# Patient Record
Sex: FEMALE | Race: WHITE | NOT HISPANIC OR LATINO | Employment: FULL TIME | URBAN - METROPOLITAN AREA
[De-identification: names, ages, dates, MRNs, and addresses within clinical notes are randomized per-mention and may not be internally consistent; named-entity substitution may affect disease eponyms.]

---

## 2017-01-18 ENCOUNTER — OFFICE VISIT - HEALTHEAST (OUTPATIENT)
Dept: FAMILY MEDICINE | Facility: CLINIC | Age: 57
End: 2017-01-18

## 2017-01-18 ENCOUNTER — OFFICE VISIT - HEALTHEAST (OUTPATIENT)
Dept: PHYSICAL THERAPY | Facility: REHABILITATION | Age: 57
End: 2017-01-18

## 2017-01-18 DIAGNOSIS — N39.46 MIXED INCONTINENCE: ICD-10-CM

## 2017-01-18 DIAGNOSIS — N39.3 FEMALE STRESS INCONTINENCE: ICD-10-CM

## 2017-01-18 DIAGNOSIS — R73.03 PREDIABETES: ICD-10-CM

## 2017-01-18 DIAGNOSIS — I10 ESSENTIAL HYPERTENSION WITH GOAL BLOOD PRESSURE LESS THAN 140/90: ICD-10-CM

## 2017-01-18 DIAGNOSIS — M62.81 MUSCLE WEAKNESS (GENERALIZED): ICD-10-CM

## 2017-01-18 ASSESSMENT — MIFFLIN-ST. JEOR: SCORE: 1315.24

## 2017-01-18 NOTE — ASSESSMENT & PLAN NOTE
56 y.o. year old female in clinic today to discuss treatment of the following conditions through diet and lifestyle modification and weight loss:  1. BMI 30.0-30.9,adult    2. Prediabetes    3. Female stress incontinence    4. Essential hypertension with goal blood pressure less than 140/90      The patient believes that she is lost control of her approach to eating in the 7 months and she is most recently been in clinic.  She has gained much of the weight that she lost back and would like to restart the program.  Specifically, she is looking for structure and accountability.  No contraindication to resuming phentermine.  Additionally, no contra indication to resuming metformin.  Refill sent to pharmacy.  She will follow-upwith us in 1 month.

## 2017-02-15 ENCOUNTER — OFFICE VISIT - HEALTHEAST (OUTPATIENT)
Dept: FAMILY MEDICINE | Facility: CLINIC | Age: 57
End: 2017-02-15

## 2017-02-15 DIAGNOSIS — R73.03 PREDIABETES: ICD-10-CM

## 2017-02-15 DIAGNOSIS — N39.3 FEMALE STRESS INCONTINENCE: ICD-10-CM

## 2017-02-15 DIAGNOSIS — I10 ESSENTIAL HYPERTENSION WITH GOAL BLOOD PRESSURE LESS THAN 140/90: ICD-10-CM

## 2017-02-15 DIAGNOSIS — E78.00 HYPERCHOLESTEREMIA: ICD-10-CM

## 2017-02-15 NOTE — ASSESSMENT & PLAN NOTE
56 y.o. year old female in clinic today to discuss treatment of the following conditions through diet and lifestyle modification and weight loss:  1. BMI 30.0-30.9,adult    2. Essential hypertension with goal blood pressure less than 140/90    3. Female stress incontinence    4. Prediabetes    5. Hypercholesteremia      The patient's efforts this past month were successful as evidenced by her weight loss as well as feeling well.  Energy is increased.  She is making healthy choices.  I am recommending that she increase her exercise as well as strategize her food choices during times of illness/stress.  Follow-up in 1 month.  The patient tolerated phentermine and is using this medication to facilitate lysed all changes.  No contraindication to continuing.

## 2017-03-13 ENCOUNTER — COMMUNICATION - HEALTHEAST (OUTPATIENT)
Dept: FAMILY MEDICINE | Facility: CLINIC | Age: 57
End: 2017-03-13

## 2017-03-22 ENCOUNTER — OFFICE VISIT - HEALTHEAST (OUTPATIENT)
Dept: FAMILY MEDICINE | Facility: CLINIC | Age: 57
End: 2017-03-22

## 2017-03-22 DIAGNOSIS — I10 ESSENTIAL HYPERTENSION WITH GOAL BLOOD PRESSURE LESS THAN 140/90: ICD-10-CM

## 2017-03-22 DIAGNOSIS — N39.3 FEMALE STRESS INCONTINENCE: ICD-10-CM

## 2017-03-22 DIAGNOSIS — E78.00 HYPERCHOLESTEREMIA: ICD-10-CM

## 2017-03-22 DIAGNOSIS — R73.03 PREDIABETES: ICD-10-CM

## 2017-03-22 NOTE — ASSESSMENT & PLAN NOTE
57 y.o. year old female in clinic today to discuss treatment of the following conditions through diet and lifestyle modification and weight loss:  1. BMI 30.0-30.9,adult    2. Hypercholesteremia    3. Essential hypertension with goalblood pressure less than 140/90    4. Female stress incontinence    5. Prediabetes      The patient's efforts this past month were successful as evidenced by weight loss and improvement in energy.  I recommend that the patient focus on continuing to expand exercise and continue to journal.   - start biking.   - continue journaling   - continue metformin, phentermine   - fasting labs when able.

## 2017-04-07 ENCOUNTER — AMBULATORY - HEALTHEAST (OUTPATIENT)
Dept: LAB | Facility: CLINIC | Age: 57
End: 2017-04-07

## 2017-04-07 DIAGNOSIS — E78.00 HYPERCHOLESTEREMIA: ICD-10-CM

## 2017-04-07 DIAGNOSIS — R73.03 PREDIABETES: ICD-10-CM

## 2017-04-07 LAB
CHOLEST SERPL-MCNC: 208 MG/DL
FASTING STATUS PATIENT QL REPORTED: YES
HBA1C MFR BLD: 5.7 % (ref 3.5–6)
HDLC SERPL-MCNC: 53 MG/DL
LDLC SERPL CALC-MCNC: 135 MG/DL
TRIGL SERPL-MCNC: 99 MG/DL

## 2017-04-24 ENCOUNTER — COMMUNICATION - HEALTHEAST (OUTPATIENT)
Dept: FAMILY MEDICINE | Facility: CLINIC | Age: 57
End: 2017-04-24

## 2017-05-03 ENCOUNTER — OFFICE VISIT - HEALTHEAST (OUTPATIENT)
Dept: FAMILY MEDICINE | Facility: CLINIC | Age: 57
End: 2017-05-03

## 2017-05-03 DIAGNOSIS — R73.03 PREDIABETES: ICD-10-CM

## 2017-05-03 DIAGNOSIS — I10 ESSENTIAL HYPERTENSION WITH GOAL BLOOD PRESSURE LESS THAN 140/90: ICD-10-CM

## 2017-05-03 DIAGNOSIS — E66.9 ADIPOSITY: ICD-10-CM

## 2017-05-03 DIAGNOSIS — N39.3 FEMALE STRESS INCONTINENCE: ICD-10-CM

## 2017-05-03 DIAGNOSIS — E78.00 HYPERCHOLESTEREMIA: ICD-10-CM

## 2017-05-03 NOTE — ASSESSMENT & PLAN NOTE
57 y.o. year old female in clinic today to discuss treatment of the following conditions through diet and lifestyle modification and weight loss:  1. BMI 30.0-30.9,adult    2. Prediabetes    3. Essential hypertension with goal blood pressure less than 140/90    4. Hypercholesteremia    5. Female stress incontinence    6. Adiposity      The patient's efforts this past month were successful as evidenced by weight loss despite multiple social situations.     - continue exercising   - continue phentermine andmeformin   - reviewed labs.  A1c is consistent with prediabetes   - RTC one month.

## 2017-06-07 ENCOUNTER — OFFICE VISIT - HEALTHEAST (OUTPATIENT)
Dept: FAMILY MEDICINE | Facility: CLINIC | Age: 57
End: 2017-06-07

## 2017-06-07 DIAGNOSIS — R73.03 PREDIABETES: ICD-10-CM

## 2017-06-07 DIAGNOSIS — E66.9 ADIPOSITY: ICD-10-CM

## 2017-06-07 DIAGNOSIS — I10 ESSENTIAL HYPERTENSION WITH GOAL BLOOD PRESSURE LESS THAN 140/90: ICD-10-CM

## 2017-06-07 DIAGNOSIS — N39.3 FEMALE STRESS INCONTINENCE: ICD-10-CM

## 2017-06-07 DIAGNOSIS — E78.00 HYPERCHOLESTEREMIA: ICD-10-CM

## 2017-06-07 NOTE — ASSESSMENT & PLAN NOTE
57 y.o. year old female in clinic today to discuss treatment of the following conditions through diet and lifestyle modification and weight loss:  1. BMI 30.0-30.9,adult    2. Prediabetes    3. Essential hypertension with goal blood pressure less than 140/90    4. Hypercholesteremia    5. Female stress incontinence    6. Adiposity      The patient's efforts this past month were successful as evidenced by weight stability.  I recommend that the patient focus on meal planning journaling as this will address her self sabotaging behavior.   -Continue phentermine.  Consider decreasing based on age and weight at next visit.  -Regardless of weight loss, we will obtain a body composition analysis at the next visit.  -The patient has a goal of 140 pounds.  We did discuss that she has been successful in her program and that her weight is approaching normal weight for height using BMI is a measure.  She has accomplished most of her non-quantitative goals which is feeling more energetic, increasing physical activity, eating healthier foods, resolution of hypertension.  -We will plan to check a hemoglobin A1c in 1-2 months.

## 2017-07-12 ENCOUNTER — OFFICE VISIT - HEALTHEAST (OUTPATIENT)
Dept: FAMILY MEDICINE | Facility: CLINIC | Age: 57
End: 2017-07-12

## 2017-07-12 DIAGNOSIS — N39.3 FEMALE STRESS INCONTINENCE: ICD-10-CM

## 2017-07-12 DIAGNOSIS — R73.03 PREDIABETES: ICD-10-CM

## 2017-07-12 DIAGNOSIS — E78.00 HYPERCHOLESTEREMIA: ICD-10-CM

## 2017-07-12 DIAGNOSIS — E66.9 ADIPOSITY: ICD-10-CM

## 2017-07-12 DIAGNOSIS — I10 ESSENTIAL HYPERTENSION WITH GOAL BLOOD PRESSURE LESS THAN 140/90: ICD-10-CM

## 2017-07-12 NOTE — ASSESSMENT & PLAN NOTE
57 y.o. year old female in clinic today to discuss treatment of the following conditions through diet and lifestyle modification and weight loss:  1. BMI 30.0-30.9,adult    2. Prediabetes    3. Essential hypertension with goal blood pressure less than 140/90    4. Hypercholesteremia    5. Female stress incontinence    6. Adiposity      The patient's efforts this past month were successful as evidenced by weight loss.  I recommend that the patient focus on reducing social calories.    - continue phentermine   - continue to track   - resume exercise

## 2017-10-23 ENCOUNTER — RECORDS - HEALTHEAST (OUTPATIENT)
Dept: ADMINISTRATIVE | Facility: OTHER | Age: 57
End: 2017-10-23

## 2017-12-17 ENCOUNTER — COMMUNICATION - HEALTHEAST (OUTPATIENT)
Dept: FAMILY MEDICINE | Facility: CLINIC | Age: 57
End: 2017-12-17

## 2017-12-19 ENCOUNTER — AMBULATORY - HEALTHEAST (OUTPATIENT)
Dept: FAMILY MEDICINE | Facility: CLINIC | Age: 57
End: 2017-12-19

## 2017-12-19 DIAGNOSIS — N95.1 HOT FLASHES DUE TO MENOPAUSE: ICD-10-CM

## 2017-12-19 DIAGNOSIS — R23.2 HOT FLASHES: ICD-10-CM

## 2018-01-10 ENCOUNTER — HOSPITAL ENCOUNTER (OUTPATIENT)
Dept: MAMMOGRAPHY | Facility: CLINIC | Age: 58
Discharge: HOME | End: 2018-01-10
Attending: NURSE PRACTITIONER

## 2018-01-10 DIAGNOSIS — Z12.31 VISIT FOR SCREENING MAMMOGRAM: ICD-10-CM

## 2018-01-18 ENCOUNTER — COMMUNICATION - HEALTHEAST (OUTPATIENT)
Dept: FAMILY MEDICINE | Facility: CLINIC | Age: 58
End: 2018-01-18

## 2018-03-26 ENCOUNTER — COMMUNICATION - HEALTHEAST (OUTPATIENT)
Dept: FAMILY MEDICINE | Facility: CLINIC | Age: 58
End: 2018-03-26

## 2018-04-18 ENCOUNTER — OFFICE VISIT - HEALTHEAST (OUTPATIENT)
Dept: FAMILY MEDICINE | Facility: CLINIC | Age: 58
End: 2018-04-18

## 2018-04-18 DIAGNOSIS — Z79.899 HIGH RISK MEDICATION USE: ICD-10-CM

## 2018-05-01 ENCOUNTER — COMMUNICATION - HEALTHEAST (OUTPATIENT)
Dept: FAMILY MEDICINE | Facility: CLINIC | Age: 58
End: 2018-05-01

## 2018-06-14 ENCOUNTER — RECORDS - HEALTHEAST (OUTPATIENT)
Dept: ADMINISTRATIVE | Facility: OTHER | Age: 58
End: 2018-06-14

## 2018-06-20 ENCOUNTER — COMMUNICATION - HEALTHEAST (OUTPATIENT)
Dept: FAMILY MEDICINE | Facility: CLINIC | Age: 58
End: 2018-06-20

## 2018-06-27 ENCOUNTER — OFFICE VISIT - HEALTHEAST (OUTPATIENT)
Dept: FAMILY MEDICINE | Facility: CLINIC | Age: 58
End: 2018-06-27

## 2018-06-27 DIAGNOSIS — Z79.899 HIGH RISK MEDICATION USE: ICD-10-CM

## 2018-07-12 ENCOUNTER — COMMUNICATION - HEALTHEAST (OUTPATIENT)
Dept: FAMILY MEDICINE | Facility: CLINIC | Age: 58
End: 2018-07-12

## 2018-07-25 ENCOUNTER — OFFICE VISIT - HEALTHEAST (OUTPATIENT)
Dept: FAMILY MEDICINE | Facility: CLINIC | Age: 58
End: 2018-07-25

## 2018-07-25 DIAGNOSIS — E55.9 VITAMIN D DEFICIENCY: ICD-10-CM

## 2018-07-25 DIAGNOSIS — N93.8 DYSFUNCTIONAL UTERINE BLEEDING: ICD-10-CM

## 2018-07-25 DIAGNOSIS — E78.00 HYPERCHOLESTEREMIA: ICD-10-CM

## 2018-07-25 DIAGNOSIS — N39.3 FEMALE STRESS INCONTINENCE: ICD-10-CM

## 2018-07-25 DIAGNOSIS — Z78.0 MENOPAUSE: ICD-10-CM

## 2018-07-25 DIAGNOSIS — C44.91 BASAL CELL CARCINOMA OF SKIN: ICD-10-CM

## 2018-07-25 DIAGNOSIS — R73.03 PREDIABETES: ICD-10-CM

## 2018-07-25 DIAGNOSIS — I10 ESSENTIAL HYPERTENSION: ICD-10-CM

## 2018-07-25 DIAGNOSIS — Z82.62 FAMILY HISTORY OF OSTEOPOROSIS: ICD-10-CM

## 2018-07-25 DIAGNOSIS — Z12.4 SCREENING FOR MALIGNANT NEOPLASM OF CERVIX: ICD-10-CM

## 2018-07-25 DIAGNOSIS — Z00.00 ROUTINE GENERAL MEDICAL EXAMINATION AT A HEALTH CARE FACILITY: ICD-10-CM

## 2018-07-25 DIAGNOSIS — Z79.899 HIGH RISK MEDICATION USE: ICD-10-CM

## 2018-07-25 ASSESSMENT — MIFFLIN-ST. JEOR: SCORE: 1289.44

## 2018-07-26 LAB
HPV SOURCE: NORMAL
HUMAN PAPILLOMA VIRUS 16 DNA: NEGATIVE
HUMAN PAPILLOMA VIRUS 18 DNA: NEGATIVE
HUMAN PAPILLOMA VIRUS FINAL DIAGNOSIS: NORMAL
HUMAN PAPILLOMA VIRUS OTHER HR: NEGATIVE
SPECIMEN DESCRIPTION: NORMAL

## 2018-07-31 ENCOUNTER — AMBULATORY - HEALTHEAST (OUTPATIENT)
Dept: LAB | Facility: CLINIC | Age: 58
End: 2018-07-31

## 2018-07-31 DIAGNOSIS — I10 ESSENTIAL HYPERTENSION: ICD-10-CM

## 2018-07-31 DIAGNOSIS — E78.00 HYPERCHOLESTEREMIA: ICD-10-CM

## 2018-07-31 DIAGNOSIS — Z78.0 MENOPAUSE: ICD-10-CM

## 2018-07-31 DIAGNOSIS — E55.9 VITAMIN D DEFICIENCY: ICD-10-CM

## 2018-07-31 DIAGNOSIS — N93.8 DYSFUNCTIONAL UTERINE BLEEDING: ICD-10-CM

## 2018-07-31 LAB
ALBUMIN SERPL-MCNC: 4 G/DL (ref 3.5–5)
ALP SERPL-CCNC: 90 U/L (ref 45–120)
ALT SERPL W P-5'-P-CCNC: 17 U/L (ref 0–45)
ANION GAP SERPL CALCULATED.3IONS-SCNC: 11 MMOL/L (ref 5–18)
AST SERPL W P-5'-P-CCNC: 24 U/L (ref 0–40)
BILIRUB SERPL-MCNC: 0.4 MG/DL (ref 0–1)
BUN SERPL-MCNC: 12 MG/DL (ref 8–22)
CALCIUM SERPL-MCNC: 9.7 MG/DL (ref 8.5–10.5)
CHLORIDE BLD-SCNC: 102 MMOL/L (ref 98–107)
CHOLEST SERPL-MCNC: 248 MG/DL
CO2 SERPL-SCNC: 27 MMOL/L (ref 22–31)
CREAT SERPL-MCNC: 0.77 MG/DL (ref 0.6–1.1)
ERYTHROCYTE [DISTWIDTH] IN BLOOD BY AUTOMATED COUNT: 11.5 % (ref 11–14.5)
FASTING STATUS PATIENT QL REPORTED: YES
FSH SERPL-ACNC: 74 MIU/ML
GFR SERPL CREATININE-BSD FRML MDRD: >60 ML/MIN/1.73M2
GLUCOSE BLD-MCNC: 101 MG/DL (ref 70–125)
HCT VFR BLD AUTO: 46.3 % (ref 35–47)
HDLC SERPL-MCNC: 56 MG/DL
HGB BLD-MCNC: 15.2 G/DL (ref 12–16)
LDLC SERPL CALC-MCNC: 169 MG/DL
MCH RBC QN AUTO: 28.7 PG (ref 27–34)
MCHC RBC AUTO-ENTMCNC: 32.8 G/DL (ref 32–36)
MCV RBC AUTO: 87 FL (ref 80–100)
PLATELET # BLD AUTO: 332 THOU/UL (ref 140–440)
PMV BLD AUTO: 7 FL (ref 7–10)
POTASSIUM BLD-SCNC: 4.4 MMOL/L (ref 3.5–5)
PROLACTIN SERPL-MCNC: 7.6 NG/ML (ref 0–20)
PROT SERPL-MCNC: 7.6 G/DL (ref 6–8)
RBC # BLD AUTO: 5.3 MILL/UL (ref 3.8–5.4)
SODIUM SERPL-SCNC: 140 MMOL/L (ref 136–145)
TRIGL SERPL-MCNC: 114 MG/DL
TSH SERPL DL<=0.005 MIU/L-ACNC: 1.33 UIU/ML (ref 0.3–5)
WBC: 6.8 THOU/UL (ref 4–11)

## 2018-08-01 LAB
25(OH)D3 SERPL-MCNC: 33.1 NG/ML (ref 30–80)
25(OH)D3 SERPL-MCNC: 33.1 NG/ML (ref 30–80)
BKR LAB AP ABNORMAL BLEEDING: NO
BKR LAB AP BIRTH CONTROL/HORMONES: NORMAL
BKR LAB AP CERVICAL APPEARANCE: NORMAL
BKR LAB AP GYN ADEQUACY: NORMAL
BKR LAB AP GYN INTERPRETATION: NORMAL
BKR LAB AP HPV REFLEX: NORMAL
BKR LAB AP LMP: NORMAL
BKR LAB AP PATIENT STATUS: NO
BKR LAB AP PREVIOUS ABNORMAL: NORMAL
BKR LAB AP PREVIOUS NORMAL: NORMAL
HIGH RISK?: NO
PATH REPORT.COMMENTS IMP SPEC: NORMAL
RESULT FLAG (HE HISTORICAL CONVERSION): NORMAL

## 2018-08-16 ENCOUNTER — AMBULATORY - HEALTHEAST (OUTPATIENT)
Dept: ADMINISTRATIVE | Facility: REHABILITATION | Age: 58
End: 2018-08-16

## 2018-08-16 DIAGNOSIS — M25.519 SHOULDER PAIN: ICD-10-CM

## 2018-08-20 ENCOUNTER — AMBULATORY - HEALTHEAST (OUTPATIENT)
Dept: FAMILY MEDICINE | Facility: CLINIC | Age: 58
End: 2018-08-20

## 2018-08-21 ENCOUNTER — COMMUNICATION - HEALTHEAST (OUTPATIENT)
Dept: FAMILY MEDICINE | Facility: CLINIC | Age: 58
End: 2018-08-21

## 2018-08-21 ENCOUNTER — OFFICE VISIT - HEALTHEAST (OUTPATIENT)
Dept: PHYSICAL THERAPY | Facility: REHABILITATION | Age: 58
End: 2018-08-21

## 2018-08-21 DIAGNOSIS — M25.512 CHRONIC PAIN OF BOTH SHOULDERS: ICD-10-CM

## 2018-08-21 DIAGNOSIS — G89.29 CHRONIC PAIN OF BOTH SHOULDERS: ICD-10-CM

## 2018-08-21 DIAGNOSIS — R29.3 POOR POSTURE: ICD-10-CM

## 2018-08-21 DIAGNOSIS — M25.511 CHRONIC PAIN OF BOTH SHOULDERS: ICD-10-CM

## 2018-08-22 ENCOUNTER — RECORDS - HEALTHEAST (OUTPATIENT)
Dept: ADMINISTRATIVE | Facility: OTHER | Age: 58
End: 2018-08-22

## 2018-08-23 ENCOUNTER — COMMUNICATION - HEALTHEAST (OUTPATIENT)
Dept: FAMILY MEDICINE | Facility: CLINIC | Age: 58
End: 2018-08-23

## 2018-08-27 ENCOUNTER — OFFICE VISIT - HEALTHEAST (OUTPATIENT)
Dept: PHYSICAL THERAPY | Facility: REHABILITATION | Age: 58
End: 2018-08-27

## 2018-08-27 DIAGNOSIS — M25.512 CHRONIC PAIN OF BOTH SHOULDERS: ICD-10-CM

## 2018-08-27 DIAGNOSIS — R29.3 POOR POSTURE: ICD-10-CM

## 2018-08-27 DIAGNOSIS — G89.29 CHRONIC PAIN OF BOTH SHOULDERS: ICD-10-CM

## 2018-08-27 DIAGNOSIS — M25.511 CHRONIC PAIN OF BOTH SHOULDERS: ICD-10-CM

## 2018-09-19 ENCOUNTER — OFFICE VISIT - HEALTHEAST (OUTPATIENT)
Dept: FAMILY MEDICINE | Facility: CLINIC | Age: 58
End: 2018-09-19

## 2018-09-19 DIAGNOSIS — Z79.899 HIGH RISK MEDICATION USE: ICD-10-CM

## 2018-10-02 ENCOUNTER — OFFICE VISIT - HEALTHEAST (OUTPATIENT)
Dept: PHYSICAL THERAPY | Facility: REHABILITATION | Age: 58
End: 2018-10-02

## 2018-10-02 DIAGNOSIS — M25.512 CHRONIC PAIN OF BOTH SHOULDERS: ICD-10-CM

## 2018-10-02 DIAGNOSIS — G89.29 CHRONIC PAIN OF BOTH SHOULDERS: ICD-10-CM

## 2018-10-02 DIAGNOSIS — R29.3 POOR POSTURE: ICD-10-CM

## 2018-10-02 DIAGNOSIS — M25.511 CHRONIC PAIN OF BOTH SHOULDERS: ICD-10-CM

## 2019-01-22 ENCOUNTER — COMMUNICATION - HEALTHEAST (OUTPATIENT)
Dept: FAMILY MEDICINE | Facility: CLINIC | Age: 59
End: 2019-01-22

## 2019-01-22 ENCOUNTER — AMBULATORY - HEALTHEAST (OUTPATIENT)
Dept: FAMILY MEDICINE | Facility: CLINIC | Age: 59
End: 2019-01-22

## 2019-01-23 ENCOUNTER — AMBULATORY - HEALTHEAST (OUTPATIENT)
Dept: FAMILY MEDICINE | Facility: CLINIC | Age: 59
End: 2019-01-23

## 2019-01-29 ENCOUNTER — HOSPITAL ENCOUNTER (OUTPATIENT)
Dept: MAMMOGRAPHY | Facility: CLINIC | Age: 59
Discharge: HOME | End: 2019-01-29
Attending: FAMILY MEDICINE

## 2019-01-29 DIAGNOSIS — Z12.31 ENCOUNTER FOR SCREENING MAMMOGRAM FOR BREAST CANCER: ICD-10-CM

## 2019-04-01 ENCOUNTER — COMMUNICATION - HEALTHEAST (OUTPATIENT)
Dept: FAMILY MEDICINE | Facility: CLINIC | Age: 59
End: 2019-04-01

## 2019-04-01 DIAGNOSIS — Z79.899 HIGH RISK MEDICATION USE: ICD-10-CM

## 2019-05-15 ENCOUNTER — OFFICE VISIT - HEALTHEAST (OUTPATIENT)
Dept: FAMILY MEDICINE | Facility: CLINIC | Age: 59
End: 2019-05-15

## 2019-05-15 DIAGNOSIS — Z79.899 HIGH RISK MEDICATION USE: ICD-10-CM

## 2019-06-19 ENCOUNTER — COMMUNICATION - HEALTHEAST (OUTPATIENT)
Dept: FAMILY MEDICINE | Facility: CLINIC | Age: 59
End: 2019-06-19

## 2019-06-19 DIAGNOSIS — Z79.890 HORMONE REPLACEMENT THERAPY (HRT): ICD-10-CM

## 2019-08-10 ENCOUNTER — COMMUNICATION - HEALTHEAST (OUTPATIENT)
Dept: FAMILY MEDICINE | Facility: CLINIC | Age: 59
End: 2019-08-10

## 2019-08-10 DIAGNOSIS — I10 ESSENTIAL HYPERTENSION: ICD-10-CM

## 2019-08-30 ENCOUNTER — COMMUNICATION - HEALTHEAST (OUTPATIENT)
Dept: FAMILY MEDICINE | Facility: CLINIC | Age: 59
End: 2019-08-30

## 2019-08-30 DIAGNOSIS — R45.86 MOOD CHANGE: ICD-10-CM

## 2019-10-02 ENCOUNTER — OFFICE VISIT - HEALTHEAST (OUTPATIENT)
Dept: FAMILY MEDICINE | Facility: CLINIC | Age: 59
End: 2019-10-02

## 2019-10-02 DIAGNOSIS — Z78.0 MENOPAUSE: ICD-10-CM

## 2019-10-02 DIAGNOSIS — E78.00 HYPERCHOLESTEREMIA: ICD-10-CM

## 2019-10-02 DIAGNOSIS — R20.2 NUMBNESS AND TINGLING: ICD-10-CM

## 2019-10-02 DIAGNOSIS — R20.0 NUMBNESS AND TINGLING: ICD-10-CM

## 2019-10-02 DIAGNOSIS — R43.8 DECREASED SENSE OF SMELL: ICD-10-CM

## 2019-10-02 DIAGNOSIS — Z00.00 ROUTINE GENERAL MEDICAL EXAMINATION AT A HEALTH CARE FACILITY: ICD-10-CM

## 2019-10-02 DIAGNOSIS — C44.91 BASAL CELL CARCINOMA (BCC), UNSPECIFIED SITE: ICD-10-CM

## 2019-10-02 DIAGNOSIS — E55.9 VITAMIN D DEFICIENCY: ICD-10-CM

## 2019-10-02 DIAGNOSIS — I10 ESSENTIAL HYPERTENSION: ICD-10-CM

## 2019-10-02 LAB
ALBUMIN SERPL-MCNC: 4.1 G/DL (ref 3.5–5)
ALP SERPL-CCNC: 87 U/L (ref 45–120)
ALT SERPL W P-5'-P-CCNC: 17 U/L (ref 0–45)
ANION GAP SERPL CALCULATED.3IONS-SCNC: 9 MMOL/L (ref 5–18)
AST SERPL W P-5'-P-CCNC: 25 U/L (ref 0–40)
BILIRUB SERPL-MCNC: 0.4 MG/DL (ref 0–1)
BUN SERPL-MCNC: 12 MG/DL (ref 8–22)
CALCIUM SERPL-MCNC: 9.6 MG/DL (ref 8.5–10.5)
CHLORIDE BLD-SCNC: 104 MMOL/L (ref 98–107)
CHOLEST SERPL-MCNC: 251 MG/DL
CO2 SERPL-SCNC: 26 MMOL/L (ref 22–31)
CREAT SERPL-MCNC: 0.79 MG/DL (ref 0.6–1.1)
ERYTHROCYTE [DISTWIDTH] IN BLOOD BY AUTOMATED COUNT: 11.7 % (ref 11–14.5)
FASTING STATUS PATIENT QL REPORTED: YES
GFR SERPL CREATININE-BSD FRML MDRD: >60 ML/MIN/1.73M2
GLUCOSE BLD-MCNC: 103 MG/DL (ref 70–125)
HCT VFR BLD AUTO: 45.4 % (ref 35–47)
HDLC SERPL-MCNC: 66 MG/DL
HGB BLD-MCNC: 15.1 G/DL (ref 12–16)
LDLC SERPL CALC-MCNC: 168 MG/DL
MCH RBC QN AUTO: 29.7 PG (ref 27–34)
MCHC RBC AUTO-ENTMCNC: 33.2 G/DL (ref 32–36)
MCV RBC AUTO: 89 FL (ref 80–100)
PLATELET # BLD AUTO: 301 THOU/UL (ref 140–440)
PMV BLD AUTO: 7.3 FL (ref 7–10)
POTASSIUM BLD-SCNC: 4.5 MMOL/L (ref 3.5–5)
PROT SERPL-MCNC: 7.5 G/DL (ref 6–8)
RBC # BLD AUTO: 5.08 MILL/UL (ref 3.8–5.4)
SODIUM SERPL-SCNC: 139 MMOL/L (ref 136–145)
TRIGL SERPL-MCNC: 86 MG/DL
TSH SERPL DL<=0.005 MIU/L-ACNC: 0.72 UIU/ML (ref 0.3–5)
VIT B12 SERPL-MCNC: 382 PG/ML (ref 213–816)
WBC: 7 THOU/UL (ref 4–11)

## 2019-10-02 ASSESSMENT — MIFFLIN-ST. JEOR: SCORE: 1294.83

## 2019-10-02 ASSESSMENT — PATIENT HEALTH QUESTIONNAIRE - PHQ9: SUM OF ALL RESPONSES TO PHQ QUESTIONS 1-9: 5

## 2019-10-03 LAB
25(OH)D3 SERPL-MCNC: 33.6 NG/ML (ref 30–80)
25(OH)D3 SERPL-MCNC: 33.6 NG/ML (ref 30–80)

## 2019-10-09 ENCOUNTER — HOSPITAL ENCOUNTER (OUTPATIENT)
Dept: MRI IMAGING | Facility: HOSPITAL | Age: 59
Discharge: HOME | End: 2019-10-09
Attending: FAMILY MEDICINE

## 2019-10-09 DIAGNOSIS — R20.0 NUMBNESS AND TINGLING: ICD-10-CM

## 2019-10-09 DIAGNOSIS — R20.2 NUMBNESS AND TINGLING: ICD-10-CM

## 2019-10-11 ENCOUNTER — COMMUNICATION - HEALTHEAST (OUTPATIENT)
Dept: FAMILY MEDICINE | Facility: CLINIC | Age: 59
End: 2019-10-11

## 2019-10-11 ENCOUNTER — AMBULATORY - HEALTHEAST (OUTPATIENT)
Dept: FAMILY MEDICINE | Facility: CLINIC | Age: 59
End: 2019-10-11

## 2019-10-11 DIAGNOSIS — M51.369 DEGENERATION OF LUMBAR INTERVERTEBRAL DISC: ICD-10-CM

## 2019-11-06 ENCOUNTER — HOSPITAL ENCOUNTER (OUTPATIENT)
Dept: PHYSICAL MEDICINE AND REHAB | Facility: CLINIC | Age: 59
Discharge: HOME | End: 2019-11-06
Attending: FAMILY MEDICINE

## 2019-11-06 DIAGNOSIS — R20.2 PARESTHESIA OF FOOT, BILATERAL: ICD-10-CM

## 2019-11-06 DIAGNOSIS — M47.816 LUMBAR SPONDYLOSIS: ICD-10-CM

## 2019-11-06 DIAGNOSIS — M54.50 LUMBAR SPINE PAIN: ICD-10-CM

## 2019-11-06 DIAGNOSIS — M51.369 DDD (DEGENERATIVE DISC DISEASE), LUMBAR: ICD-10-CM

## 2019-11-06 ASSESSMENT — MIFFLIN-ST. JEOR: SCORE: 1315.81

## 2019-12-04 ENCOUNTER — COMMUNICATION - HEALTHEAST (OUTPATIENT)
Dept: FAMILY MEDICINE | Facility: CLINIC | Age: 59
End: 2019-12-04

## 2019-12-04 ENCOUNTER — HOSPITAL ENCOUNTER (OUTPATIENT)
Dept: PHYSICAL MEDICINE AND REHAB | Facility: CLINIC | Age: 59
Discharge: HOME | End: 2019-12-04
Attending: PHYSICAL MEDICINE & REHABILITATION

## 2019-12-04 DIAGNOSIS — Z79.890 HORMONE REPLACEMENT THERAPY (HRT): ICD-10-CM

## 2019-12-04 DIAGNOSIS — R20.2 PARESTHESIA OF FOOT, BILATERAL: ICD-10-CM

## 2019-12-04 ASSESSMENT — MIFFLIN-ST. JEOR: SCORE: 1320.35

## 2019-12-11 ENCOUNTER — OFFICE VISIT - HEALTHEAST (OUTPATIENT)
Dept: PHYSICAL THERAPY | Facility: CLINIC | Age: 59
End: 2019-12-11

## 2019-12-11 ENCOUNTER — RECORDS - HEALTHEAST (OUTPATIENT)
Dept: ADMINISTRATIVE | Facility: OTHER | Age: 59
End: 2019-12-11

## 2019-12-11 DIAGNOSIS — G89.29 CHRONIC MIDLINE LOW BACK PAIN WITHOUT SCIATICA: ICD-10-CM

## 2019-12-11 DIAGNOSIS — M54.50 CHRONIC MIDLINE LOW BACK PAIN WITHOUT SCIATICA: ICD-10-CM

## 2019-12-11 DIAGNOSIS — R29.898 WEAKNESS OF BACK: ICD-10-CM

## 2019-12-18 ENCOUNTER — OFFICE VISIT - HEALTHEAST (OUTPATIENT)
Dept: PHYSICAL THERAPY | Facility: CLINIC | Age: 59
End: 2019-12-18

## 2019-12-18 DIAGNOSIS — M25.512 CHRONIC PAIN OF BOTH SHOULDERS: ICD-10-CM

## 2019-12-18 DIAGNOSIS — G89.29 CHRONIC PAIN OF BOTH SHOULDERS: ICD-10-CM

## 2019-12-18 DIAGNOSIS — R29.3 POOR POSTURE: ICD-10-CM

## 2019-12-18 DIAGNOSIS — M54.50 CHRONIC MIDLINE LOW BACK PAIN WITHOUT SCIATICA: ICD-10-CM

## 2019-12-18 DIAGNOSIS — G89.29 CHRONIC MIDLINE LOW BACK PAIN WITHOUT SCIATICA: ICD-10-CM

## 2019-12-18 DIAGNOSIS — M25.511 CHRONIC PAIN OF BOTH SHOULDERS: ICD-10-CM

## 2019-12-18 DIAGNOSIS — R29.898 WEAKNESS OF BACK: ICD-10-CM

## 2019-12-26 ENCOUNTER — OFFICE VISIT - HEALTHEAST (OUTPATIENT)
Dept: PHYSICAL THERAPY | Facility: CLINIC | Age: 59
End: 2019-12-26

## 2019-12-26 DIAGNOSIS — G89.29 CHRONIC MIDLINE LOW BACK PAIN WITHOUT SCIATICA: ICD-10-CM

## 2019-12-26 DIAGNOSIS — M54.50 CHRONIC MIDLINE LOW BACK PAIN WITHOUT SCIATICA: ICD-10-CM

## 2019-12-26 DIAGNOSIS — R29.898 WEAKNESS OF BACK: ICD-10-CM

## 2020-01-08 ENCOUNTER — HOSPITAL ENCOUNTER (OUTPATIENT)
Dept: PHYSICAL MEDICINE AND REHAB | Facility: CLINIC | Age: 60
Discharge: HOME | End: 2020-01-08
Attending: PHYSICAL MEDICINE & REHABILITATION

## 2020-01-08 ENCOUNTER — OFFICE VISIT - HEALTHEAST (OUTPATIENT)
Dept: PHYSICAL THERAPY | Facility: CLINIC | Age: 60
End: 2020-01-08

## 2020-01-08 DIAGNOSIS — M51.369 DDD (DEGENERATIVE DISC DISEASE), LUMBAR: ICD-10-CM

## 2020-01-08 DIAGNOSIS — G89.29 CHRONIC MIDLINE LOW BACK PAIN WITHOUT SCIATICA: ICD-10-CM

## 2020-01-08 DIAGNOSIS — M47.816 LUMBAR SPONDYLOSIS: ICD-10-CM

## 2020-01-08 DIAGNOSIS — R29.3 POOR POSTURE: ICD-10-CM

## 2020-01-08 DIAGNOSIS — M54.50 LUMBAR SPINE PAIN: ICD-10-CM

## 2020-01-08 DIAGNOSIS — R20.2 PARESTHESIA OF FOOT, BILATERAL: ICD-10-CM

## 2020-01-08 DIAGNOSIS — G89.29 CHRONIC PAIN OF BOTH SHOULDERS: ICD-10-CM

## 2020-01-08 DIAGNOSIS — M25.511 CHRONIC PAIN OF BOTH SHOULDERS: ICD-10-CM

## 2020-01-08 DIAGNOSIS — M25.512 CHRONIC PAIN OF BOTH SHOULDERS: ICD-10-CM

## 2020-01-08 DIAGNOSIS — M54.50 CHRONIC MIDLINE LOW BACK PAIN WITHOUT SCIATICA: ICD-10-CM

## 2020-01-08 DIAGNOSIS — R29.898 WEAKNESS OF BACK: ICD-10-CM

## 2020-01-08 ASSESSMENT — MIFFLIN-ST. JEOR: SCORE: 1320.35

## 2020-01-15 ENCOUNTER — OFFICE VISIT - HEALTHEAST (OUTPATIENT)
Dept: PHYSICAL THERAPY | Facility: CLINIC | Age: 60
End: 2020-01-15

## 2020-01-15 DIAGNOSIS — M25.511 CHRONIC PAIN OF BOTH SHOULDERS: ICD-10-CM

## 2020-01-15 DIAGNOSIS — R29.898 WEAKNESS OF BACK: ICD-10-CM

## 2020-01-15 DIAGNOSIS — M54.50 CHRONIC MIDLINE LOW BACK PAIN WITHOUT SCIATICA: ICD-10-CM

## 2020-01-15 DIAGNOSIS — R29.3 POOR POSTURE: ICD-10-CM

## 2020-01-15 DIAGNOSIS — G89.29 CHRONIC MIDLINE LOW BACK PAIN WITHOUT SCIATICA: ICD-10-CM

## 2020-01-15 DIAGNOSIS — G89.29 CHRONIC PAIN OF BOTH SHOULDERS: ICD-10-CM

## 2020-01-15 DIAGNOSIS — M25.512 CHRONIC PAIN OF BOTH SHOULDERS: ICD-10-CM

## 2020-02-19 ENCOUNTER — COMMUNICATION - HEALTHEAST (OUTPATIENT)
Dept: FAMILY MEDICINE | Facility: CLINIC | Age: 60
End: 2020-02-19

## 2020-02-29 ENCOUNTER — OFFICE VISIT - HEALTHEAST (OUTPATIENT)
Dept: FAMILY MEDICINE | Facility: CLINIC | Age: 60
End: 2020-02-29

## 2020-02-29 DIAGNOSIS — R07.0 THROAT PAIN: ICD-10-CM

## 2020-02-29 DIAGNOSIS — K12.2 UVULITIS: ICD-10-CM

## 2020-02-29 LAB — DEPRECATED S PYO AG THROAT QL EIA: NORMAL

## 2020-02-29 ASSESSMENT — MIFFLIN-ST. JEOR: SCORE: 1309.01

## 2020-03-01 LAB — GROUP A STREP BY PCR: NORMAL

## 2020-03-04 ENCOUNTER — RECORDS - HEALTHEAST (OUTPATIENT)
Dept: FAMILY MEDICINE | Facility: CLINIC | Age: 60
End: 2020-03-04

## 2020-03-04 ENCOUNTER — COMMUNICATION - HEALTHEAST (OUTPATIENT)
Dept: FAMILY MEDICINE | Facility: CLINIC | Age: 60
End: 2020-03-04

## 2020-03-04 DIAGNOSIS — Z79.890 HORMONE REPLACEMENT THERAPY (HRT): ICD-10-CM

## 2020-03-08 ENCOUNTER — COMMUNICATION - HEALTHEAST (OUTPATIENT)
Dept: FAMILY MEDICINE | Facility: CLINIC | Age: 60
End: 2020-03-08

## 2020-03-08 DIAGNOSIS — I10 ESSENTIAL HYPERTENSION: ICD-10-CM

## 2020-03-30 ENCOUNTER — COMMUNICATION - HEALTHEAST (OUTPATIENT)
Dept: FAMILY MEDICINE | Facility: CLINIC | Age: 60
End: 2020-03-30

## 2020-03-30 DIAGNOSIS — Z79.890 HORMONE REPLACEMENT THERAPY (HRT): ICD-10-CM

## 2020-05-15 ENCOUNTER — HOSPITAL ENCOUNTER (OUTPATIENT)
Dept: MAMMOGRAPHY | Facility: CLINIC | Age: 60
Discharge: HOME | End: 2020-05-15
Attending: FAMILY MEDICINE

## 2020-05-15 DIAGNOSIS — Z12.31 VISIT FOR SCREENING MAMMOGRAM: ICD-10-CM

## 2020-07-26 ENCOUNTER — COMMUNICATION - HEALTHEAST (OUTPATIENT)
Dept: FAMILY MEDICINE | Facility: CLINIC | Age: 60
End: 2020-07-26

## 2020-07-29 ENCOUNTER — OFFICE VISIT - HEALTHEAST (OUTPATIENT)
Dept: FAMILY MEDICINE | Facility: CLINIC | Age: 60
End: 2020-07-29

## 2020-07-29 DIAGNOSIS — Z79.899 HIGH RISK MEDICATION USE: ICD-10-CM

## 2020-07-29 DIAGNOSIS — Z20.822 EXPOSURE TO COVID-19 VIRUS: ICD-10-CM

## 2020-07-29 ASSESSMENT — ANXIETY QUESTIONNAIRES
3. WORRYING TOO MUCH ABOUT DIFFERENT THINGS: NOT AT ALL
7. FEELING AFRAID AS IF SOMETHING AWFUL MIGHT HAPPEN: NOT AT ALL
1. FEELING NERVOUS, ANXIOUS, OR ON EDGE: SEVERAL DAYS
IF YOU CHECKED OFF ANY PROBLEMS ON THIS QUESTIONNAIRE, HOW DIFFICULT HAVE THESE PROBLEMS MADE IT FOR YOU TO DO YOUR WORK, TAKE CARE OF THINGS AT HOME, OR GET ALONG WITH OTHER PEOPLE: NOT DIFFICULT AT ALL
6. BECOMING EASILY ANNOYED OR IRRITABLE: SEVERAL DAYS
GAD7 TOTAL SCORE: 2
5. BEING SO RESTLESS THAT IT IS HARD TO SIT STILL: NOT AT ALL
4. TROUBLE RELAXING: NOT AT ALL
2. NOT BEING ABLE TO STOP OR CONTROL WORRYING: NOT AT ALL

## 2020-07-29 ASSESSMENT — PATIENT HEALTH QUESTIONNAIRE - PHQ9: SUM OF ALL RESPONSES TO PHQ QUESTIONS 1-9: 2

## 2020-08-03 ENCOUNTER — COMMUNICATION - HEALTHEAST (OUTPATIENT)
Dept: SCHEDULING | Facility: CLINIC | Age: 60
End: 2020-08-03

## 2020-08-10 ENCOUNTER — COMMUNICATION - HEALTHEAST (OUTPATIENT)
Dept: FAMILY MEDICINE | Facility: CLINIC | Age: 60
End: 2020-08-10

## 2020-08-10 DIAGNOSIS — Z79.890 HORMONE REPLACEMENT THERAPY (HRT): ICD-10-CM

## 2020-08-19 ENCOUNTER — COMMUNICATION - HEALTHEAST (OUTPATIENT)
Dept: FAMILY MEDICINE | Facility: CLINIC | Age: 60
End: 2020-08-19

## 2020-08-19 DIAGNOSIS — R45.86 MOOD CHANGE: ICD-10-CM

## 2020-08-20 RX ORDER — CITALOPRAM HYDROBROMIDE 20 MG/1
TABLET ORAL
Qty: 90 TABLET | Refills: 3 | Status: SHIPPED | OUTPATIENT
Start: 2020-08-20 | End: 2021-08-04

## 2020-09-17 ENCOUNTER — COMMUNICATION - HEALTHEAST (OUTPATIENT)
Dept: FAMILY MEDICINE | Facility: CLINIC | Age: 60
End: 2020-09-17

## 2020-10-24 ENCOUNTER — VIRTUAL VISIT (OUTPATIENT)
Dept: FAMILY MEDICINE | Facility: OTHER | Age: 60
End: 2020-10-24

## 2020-10-25 ENCOUNTER — AMBULATORY - HEALTHEAST (OUTPATIENT)
Dept: FAMILY MEDICINE | Facility: CLINIC | Age: 60
End: 2020-10-25

## 2020-10-25 DIAGNOSIS — Z20.822 SUSPECTED COVID-19 VIRUS INFECTION: ICD-10-CM

## 2020-10-25 NOTE — PROGRESS NOTES
"Date: 10/24/2020 09:46:08  Clinician: Tamra Prater  Clinician NPI: [id number]  Patient: Ursula Pedro  Patient : 1960  Patient Address: [address] [place] [address]  Patient Phone: [phone]  Visit Protocol: URI  Patient Summary:  Ursula is a 60 year old ( : 1960 ) female who initiated a [facility] Visit for COVID-19 (Coronavirus) evaluation and screening. When asked the question \"Please sign me up to receive news, health information and promotions from [facility].\", Ursula responded \"No\".    When asked when her symptoms started, Ursula reported that she does not have any symptoms.   She denies taking antibiotic medication in the past month and having recent facial or sinus surgery in the past 60 days.    Pertinent COVID-19 (Coronavirus) information  In the past 14 days, Ursula has not worked in a congregate living setting.   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 Additional job details as reported by the patient (free text): [employer], work in [employer] and [employer]   Ursula also has not lived in a congregate living setting in the past 14 days. She lives with a healthcare worker.   Ursula has had a close contact with a laboratory-confirmed COVID-19 patient in the last 14 days. She was not exposed at her work. Additional information about contact with COVID-19 (Coronavirus) patient as reported by the patient (free text): My son was diagnosed Thursday with Covid, we live in same house though some seperation with levels lived in   Patient reported they are living in the same household with a COVID-19 positive patient.  Patient was in an enclosed space for greater than 15 minutes with a COVID-19 patient.  Since 2019, Ursula and has had upper respiratory infection (URI) or influenza-like illness. Has not been diagnosed " with lab-confirmed COVID-19 test      Date(s) of previous URI or influenza-like illness (free-text): Feb/Mar 2020     Symptoms Ursula experienced during previous URI or influenza-like illness as reported by the patient (free-text): fever, cough, uvulitis and got antibiotic which helped        Pertinent medical history  Ursula does not get yeast infections when she takes antibiotics.   Ursula needs a return to work/school note.   Weight: 155 lbs   Ursula does not smoke or use smokeless tobacco.   Additional information as reported by the patient (free text): I have left 3 messages with employee health to see what I am supposed to be doing and would really like to get a rapid test if possible to know if I can work or not.   Weight: 155 lbs    MEDICATIONS: zinc oral, cholecalciferol (vitamin D3) oral, citalopram oral, lisinopril oral, ALLERGIES: NKDA  Clinician Response:  Dear Ursula,   Based on your exposure to COVID-19 (coronavirus), we would like to test you for this virus.  1. Please call [phone] to schedule your visit. Explain that you were referred by [facility] to have a COVID-19 test. Be ready to share your [facility] visit ID number.  Please note that if you are assessed for Covid-19 testing and receive an order for testing from [facility], that the scheduling of your Covid test at [facility] may be delayed by three or four days or more due to limited availability for testing. Additional options for testing can be found on the [place] Covid-19 Response website. https://[place].gov/covid19/    The following will serve as your written order for this COVID Test, ordered by me, for the indication of suspected COVID [Z20.828]: The test will be ordered in [employer], our electronic health record, after you are scheduled. It will show as ordered and authorized by King Galan MD.  Order: COVID-19 (coronavirus) PCR for ASYMPTOMATIC EXPOSURE testing from [facility].   If you know you have had close contact with someone who tested  positive, you should be quarantined for 14 days after this exposure. You should stay in quarantine for the14 days even if the covid test is negative, the optimal time to test after exposure is 5-7 days from the exposure  Quarantine means   What should I do?  For safety, it's very important to follow these rules. Do this for 14 days after the date you were last exposed to the virus..  Stay home and away from others. Don't go to school or anywhere else. Generally quarantine means staying home from work but there are some exceptions to this. Please contact your workplace.   No hugging, kissing or shaking hands.  Don't let anyone visit.  Cover your mouth and nose with a mask, tissue or washcloth to avoid spreading germs.  Wash your hands and face often. Use soap and water.  What are the symptoms of COVID-19?  The most common symptoms are cough, fever and trouble breathing. Less common symptoms include headache, body aches, fatigue (feeling very tired), chills, sore throat, stuffy or runny nose, diarrhea (loose poop), loss of taste or smell, belly pain, and nausea or vomiting (feeling sick to your stomach or throwing up).  After 14 days, if you have still don't have symptoms, you likely don't have this virus.  If you develop symptoms, follow these guidelines.  If you're normally healthy: Please start another [facility] visit to report your symptoms. Go to [email or web address].  If you have a serious health problem (like cancer, heart failure, an organ transplant or kidney disease): Call your specialty clinic. Let them know that you might have COVID-19.  2. When it's time for your COVID test:  Stay at least 6 feet away from others. (If someone will drive you to your test, stay in the backseat, as far away from the  as you can.)  Cover your mouth and nose with a mask, tissue or washcloth.  Go straight to the testing site. Don't make any stops on the way there or back.  Please note  Caregivers in these groups are at risk for severe  illness due to COVID-19:  o People 65 years and older  o People who live in a nursing home or long-term care facility  o People with chronic disease (lung, heart, cancer, diabetes, kidney, liver, immunologic)  o People who have a weakened immune system, including those who:  Are in cancer treatment  Take medicine that weakens the immune system, such as corticosteroids  Had a bone marrow or organ transplant  Have an immune deficiency  Have poorly controlled HIV or AIDS  Are obese (body mass index of 40 or higher)  Smoke regularly  Where can I get more information?  [facility] -- About COVID-19: [email or web address]  CDC -- What to Do If You're Sick: www.cdc.gov/coronavirus/2019-ncov/about/steps-when-sick.html  [facility] -- Ending Home Isolation: www.cdc.gov/coronavirus/2019-ncov/hcp/disposition-in-home-patients.html  [facility] -- Caring for Someone: www.cdc.gov/coronavirus/2019-ncov/if-you-are-sick/care-for-someone.html  [facility] -- Interim Guidance for Hospital Discharge to Home: www.health.[facility].[place]./diseases/coronavirus/hcp/hospdischarge.pdf  [facility] clinical trials (COVID-19 research studies): clinicalaffairs.[facility]-clinical-trials  Below are the COVID-19 hotlines at the [place] Department of Health ([facility]). Interpreters are available.  For health questions: Call 392-330-6900 or 1-177.775.6012 (7 a.m. to 7 p.m.)  For questions about schools and childcare: Call 344-119-6683 or 1-106.898.9634 (7 a.m. to 7 p.m.)    Diagnosis: Contact with and (suspected) exposure to other viral communicable diseases  Diagnosis ICD: Z20.828

## 2020-10-26 ENCOUNTER — AMBULATORY - HEALTHEAST (OUTPATIENT)
Dept: FAMILY MEDICINE | Facility: CLINIC | Age: 60
End: 2020-10-26

## 2020-10-26 DIAGNOSIS — Z20.822 SUSPECTED COVID-19 VIRUS INFECTION: ICD-10-CM

## 2020-10-28 ENCOUNTER — COMMUNICATION - HEALTHEAST (OUTPATIENT)
Dept: FAMILY MEDICINE | Facility: CLINIC | Age: 60
End: 2020-10-28

## 2020-10-28 ENCOUNTER — COMMUNICATION - HEALTHEAST (OUTPATIENT)
Dept: SCHEDULING | Facility: CLINIC | Age: 60
End: 2020-10-28

## 2020-11-11 ENCOUNTER — OFFICE VISIT - HEALTHEAST (OUTPATIENT)
Dept: FAMILY MEDICINE | Facility: CLINIC | Age: 60
End: 2020-11-11

## 2020-11-11 DIAGNOSIS — E53.8 VITAMIN B12 DEFICIENCY (NON ANEMIC): ICD-10-CM

## 2020-11-11 DIAGNOSIS — R21 RASH: ICD-10-CM

## 2020-11-11 DIAGNOSIS — E78.00 HYPERCHOLESTEREMIA: ICD-10-CM

## 2020-11-11 DIAGNOSIS — D12.6 BENIGN NEOPLASM OF COLON, UNSPECIFIED PART OF COLON: ICD-10-CM

## 2020-11-11 DIAGNOSIS — I10 ESSENTIAL HYPERTENSION: ICD-10-CM

## 2020-11-11 DIAGNOSIS — E55.9 VITAMIN D DEFICIENCY: ICD-10-CM

## 2020-11-11 DIAGNOSIS — Z78.0 MENOPAUSE: ICD-10-CM

## 2020-11-11 DIAGNOSIS — C44.91 BASAL CELL CARCINOMA (BCC), UNSPECIFIED SITE: ICD-10-CM

## 2020-11-11 DIAGNOSIS — Z00.00 ROUTINE GENERAL MEDICAL EXAMINATION AT A HEALTH CARE FACILITY: ICD-10-CM

## 2020-11-11 LAB
ALBUMIN SERPL-MCNC: 3.5 G/DL (ref 3.5–5)
ALP SERPL-CCNC: 74 U/L (ref 45–120)
ALT SERPL W P-5'-P-CCNC: 14 U/L (ref 0–45)
ANION GAP SERPL CALCULATED.3IONS-SCNC: 10 MMOL/L (ref 5–18)
AST SERPL W P-5'-P-CCNC: 14 U/L (ref 0–40)
BILIRUB SERPL-MCNC: 0.3 MG/DL (ref 0–1)
BUN SERPL-MCNC: 10 MG/DL (ref 8–22)
CALCIUM SERPL-MCNC: 9 MG/DL (ref 8.5–10.5)
CHLORIDE BLD-SCNC: 102 MMOL/L (ref 98–107)
CHOLEST SERPL-MCNC: 204 MG/DL
CO2 SERPL-SCNC: 26 MMOL/L (ref 22–31)
CREAT SERPL-MCNC: 0.71 MG/DL (ref 0.6–1.1)
ERYTHROCYTE [DISTWIDTH] IN BLOOD BY AUTOMATED COUNT: 11 % (ref 11–14.5)
FASTING STATUS PATIENT QL REPORTED: YES
GFR SERPL CREATININE-BSD FRML MDRD: >60 ML/MIN/1.73M2
GLUCOSE BLD-MCNC: 91 MG/DL (ref 70–125)
HCT VFR BLD AUTO: 40 % (ref 35–47)
HDLC SERPL-MCNC: 43 MG/DL
HGB BLD-MCNC: 13.5 G/DL (ref 12–16)
LDLC SERPL CALC-MCNC: 139 MG/DL
MCH RBC QN AUTO: 29.5 PG (ref 27–34)
MCHC RBC AUTO-ENTMCNC: 33.7 G/DL (ref 32–36)
MCV RBC AUTO: 88 FL (ref 80–100)
PLATELET # BLD AUTO: 448 THOU/UL (ref 140–440)
PMV BLD AUTO: 6.5 FL (ref 7–10)
POTASSIUM BLD-SCNC: 4.5 MMOL/L (ref 3.5–5)
PROT SERPL-MCNC: 7.1 G/DL (ref 6–8)
RBC # BLD AUTO: 4.56 MILL/UL (ref 3.8–5.4)
SODIUM SERPL-SCNC: 138 MMOL/L (ref 136–145)
TRIGL SERPL-MCNC: 110 MG/DL
VIT B12 SERPL-MCNC: 378 PG/ML (ref 213–816)
WBC: 7.2 THOU/UL (ref 4–11)

## 2020-11-11 ASSESSMENT — ANXIETY QUESTIONNAIRES
2. NOT BEING ABLE TO STOP OR CONTROL WORRYING: NOT AT ALL
7. FEELING AFRAID AS IF SOMETHING AWFUL MIGHT HAPPEN: NOT AT ALL
3. WORRYING TOO MUCH ABOUT DIFFERENT THINGS: NOT AT ALL
GAD7 TOTAL SCORE: 0
5. BEING SO RESTLESS THAT IT IS HARD TO SIT STILL: NOT AT ALL
4. TROUBLE RELAXING: NOT AT ALL
1. FEELING NERVOUS, ANXIOUS, OR ON EDGE: NOT AT ALL
6. BECOMING EASILY ANNOYED OR IRRITABLE: NOT AT ALL

## 2020-11-11 ASSESSMENT — MIFFLIN-ST. JEOR: SCORE: 1285.76

## 2020-11-11 ASSESSMENT — PATIENT HEALTH QUESTIONNAIRE - PHQ9: SUM OF ALL RESPONSES TO PHQ QUESTIONS 1-9: 1

## 2020-11-12 LAB
25(OH)D3 SERPL-MCNC: 41.1 NG/ML (ref 30–80)
25(OH)D3 SERPL-MCNC: 41.1 NG/ML (ref 30–80)
HPV SOURCE: NORMAL
HUMAN PAPILLOMA VIRUS 16 DNA: NEGATIVE
HUMAN PAPILLOMA VIRUS 18 DNA: NEGATIVE
HUMAN PAPILLOMA VIRUS FINAL DIAGNOSIS: NORMAL
HUMAN PAPILLOMA VIRUS OTHER HR: NEGATIVE
SPECIMEN DESCRIPTION: NORMAL

## 2020-11-22 ENCOUNTER — COMMUNICATION - HEALTHEAST (OUTPATIENT)
Dept: FAMILY MEDICINE | Facility: CLINIC | Age: 60
End: 2020-11-22

## 2020-11-22 DIAGNOSIS — I10 ESSENTIAL HYPERTENSION: ICD-10-CM

## 2020-11-23 ENCOUNTER — RECORDS - HEALTHEAST (OUTPATIENT)
Dept: BONE DENSITY | Facility: CLINIC | Age: 60
End: 2020-11-23

## 2020-11-23 ENCOUNTER — RECORDS - HEALTHEAST (OUTPATIENT)
Dept: ADMINISTRATIVE | Facility: OTHER | Age: 60
End: 2020-11-23

## 2020-11-23 DIAGNOSIS — Z78.0 ASYMPTOMATIC MENOPAUSAL STATE: ICD-10-CM

## 2020-11-23 RX ORDER — LISINOPRIL 5 MG/1
TABLET ORAL
Qty: 90 TABLET | Refills: 3 | Status: SHIPPED | OUTPATIENT
Start: 2020-11-23 | End: 2021-11-08

## 2020-12-02 ENCOUNTER — RECORDS - HEALTHEAST (OUTPATIENT)
Dept: ADMINISTRATIVE | Facility: OTHER | Age: 60
End: 2020-12-02

## 2020-12-09 ENCOUNTER — COMMUNICATION - HEALTHEAST (OUTPATIENT)
Dept: FAMILY MEDICINE | Facility: CLINIC | Age: 60
End: 2020-12-09

## 2020-12-10 ENCOUNTER — AMBULATORY - HEALTHEAST (OUTPATIENT)
Dept: FAMILY MEDICINE | Facility: CLINIC | Age: 60
End: 2020-12-10

## 2020-12-10 DIAGNOSIS — N95.0 POSTMENOPAUSAL BLEEDING: ICD-10-CM

## 2020-12-23 ENCOUNTER — RECORDS - HEALTHEAST (OUTPATIENT)
Dept: ADMINISTRATIVE | Facility: OTHER | Age: 60
End: 2020-12-23

## 2021-01-13 ENCOUNTER — RECORDS - HEALTHEAST (OUTPATIENT)
Dept: ADMINISTRATIVE | Facility: OTHER | Age: 61
End: 2021-01-13

## 2021-04-20 ENCOUNTER — COMMUNICATION - HEALTHEAST (OUTPATIENT)
Dept: FAMILY MEDICINE | Facility: CLINIC | Age: 61
End: 2021-04-20

## 2021-04-20 DIAGNOSIS — Z79.890 HORMONE REPLACEMENT THERAPY (HRT): ICD-10-CM

## 2021-04-21 RX ORDER — ESTROGEN,CON/M-PROGEST ACET 0.3-1.5MG
TABLET ORAL
Qty: 84 TABLET | Refills: 0 | Status: SHIPPED | OUTPATIENT
Start: 2021-04-21 | End: 2022-02-09

## 2021-05-26 ENCOUNTER — RECORDS - HEALTHEAST (OUTPATIENT)
Dept: ADMINISTRATIVE | Facility: CLINIC | Age: 61
End: 2021-05-26

## 2021-05-26 ENCOUNTER — HOSPITAL ENCOUNTER (OUTPATIENT)
Dept: MAMMOGRAPHY | Facility: CLINIC | Age: 61
Discharge: HOME | End: 2021-05-26

## 2021-05-26 DIAGNOSIS — Z12.31 VISIT FOR SCREENING MAMMOGRAM: ICD-10-CM

## 2021-05-26 ASSESSMENT — PATIENT HEALTH QUESTIONNAIRE - PHQ9
SUM OF ALL RESPONSES TO PHQ QUESTIONS 1-9: 2
SUM OF ALL RESPONSES TO PHQ QUESTIONS 1-9: 1
SUM OF ALL RESPONSES TO PHQ QUESTIONS 1-9: 5

## 2021-05-27 ENCOUNTER — RECORDS - HEALTHEAST (OUTPATIENT)
Dept: ADMINISTRATIVE | Facility: CLINIC | Age: 61
End: 2021-05-27

## 2021-05-28 ENCOUNTER — RECORDS - HEALTHEAST (OUTPATIENT)
Dept: ADMINISTRATIVE | Facility: CLINIC | Age: 61
End: 2021-05-28

## 2021-05-28 ASSESSMENT — ANXIETY QUESTIONNAIRES
GAD7 TOTAL SCORE: 2
GAD7 TOTAL SCORE: 0

## 2021-05-28 NOTE — PROGRESS NOTES
Assessment: Medication check for Phentermine  1. High risk medication use  phentermine 30 MG capsule       Plan: The following high BMI interventions were performed this visit: encouragement to exercise, weight monitoring, dietary needs education, weight loss from baseline weight, dietary management education, guidance, and counseling and lifestyle education regarding diet  Stop med and radha SAHNI if any chest pain, palpations or shortness of breath. Follow-up in 1 month. This is month # 13 . Phentermine dose is 30 mg.    Patient Instructions   A weight of 147-148 lbs would be into the BMI of 24.     Decrease citalopram to 10 mg by taking a half tab for 6-8 days and then you can stop it.     Continue phentermine 30 mg for 2 more months.     If you continue having the hot flashes and are wishing a prescription for hormone replacement, please let us know.     Follow up in 3-6 months for a fasting physical.       Chief Complaint   Patient presents with     Medication Management     would like to discuss stopping the phentermine and citalopram        Subjective: Ursula Pedro comes in today for a medication check for Phentermine. No chest pain, palpations or shortness of breath. She has been on the med for  13 months. She has lost 0 pounds in the last month and 8 pounds overall. She exercises 60 minutes 5 days a week. She sometimes journals calories.     Citalopram: The patient has been on 20 mg citalopram for 1.5 years for menopause symptoms of hot flashes and sleep deprivation. She states that she has been having more hot flashes than usual lately, and wishes to stop taking the medication.     Health maintenance: Mammogram done 1/2019. Colonoscopy done 10/2017. Patient sees dermatology yearly    ROS:  Positive for: Hot flashes  Denies: Chest pain, palpations, shortness of breath.    PMFSH  Family history of breast cancer, maternal grandmother.     Objective: /87 (Patient Site: Left Arm, Patient Position:  Sitting, Cuff Size: Adult Regular)   Pulse 93   Resp 14   Wt 157 lb 6 oz (71.4 kg)   LMP 03/29/2018   BMI 26.19 kg/m    General: No apparent distress  CV: Regular rate and rhythm without murmur  Lungs: Clear to auscultation bilaterally    ADDITIONAL HISTORY SUMMARIZED (2): None.  DECISION TO OBTAIN EXTRA INFORMATION (1): None.   RADIOLOGY TESTS (1): None.  LABS (1): None.  MEDICINE TESTS (1): None.  INDEPENDENT REVIEW (2 each): None.     Total data points: 0    The visit lasted a total of 29 minutes face to face with the patient. Over 50% of the time was spent counseling and educating the patient about weight management.    IKaykay, am scribing for and in the presence of, Dr. Mcgarry at  5/15/19 . 3:33pm    IDr. Mcgarry, personally performed the services described in this documentation, as scribed by Kaykay Skelton in my presence, and it is both accurate and complete.

## 2021-05-28 NOTE — PATIENT INSTRUCTIONS - HE
A weight of 147-148 lbs would be into the BMI of 24.     Decrease citalopram to 10 mg by taking a half tab for 6-8 days and then you can stop it.     Continue phentermine 30 mg for 2 more months.     If you continue having the hot flashes and are wishing a prescription for hormone replacement, please let us know.     Follow up in 3-6 months for a fasting physical.

## 2021-05-30 ENCOUNTER — RECORDS - HEALTHEAST (OUTPATIENT)
Dept: ADMINISTRATIVE | Facility: CLINIC | Age: 61
End: 2021-05-30

## 2021-05-30 VITALS — WEIGHT: 163 LBS | BODY MASS INDEX: 27.16 KG/M2 | HEIGHT: 65 IN

## 2021-05-30 VITALS — WEIGHT: 153 LBS | BODY MASS INDEX: 25.46 KG/M2

## 2021-05-30 VITALS — BODY MASS INDEX: 26.66 KG/M2 | WEIGHT: 160.2 LBS

## 2021-05-31 VITALS — WEIGHT: 148.56 LBS | BODY MASS INDEX: 24.72 KG/M2

## 2021-05-31 VITALS — BODY MASS INDEX: 25.14 KG/M2 | WEIGHT: 151.1 LBS

## 2021-05-31 VITALS — WEIGHT: 150.6 LBS | BODY MASS INDEX: 25.06 KG/M2

## 2021-05-31 NOTE — TELEPHONE ENCOUNTER
Refill Given    Refill given per Policy, patient informed they are overdue for Labs   OV 5/15/19    aNtaliya Cloud, [facility] Triage/Med Refill 8/10/2019    Requested Prescriptions   Pending Prescriptions Disp Refills     lisinopril (PRINIVIL,ZESTRIL) 5 MG tablet [Pharmacy Med Name: LISINOPRIL 5MG      TAB] 90 tablet 3     Sig: TAKE 1 TABLET BY MOUTH ONCE DAILY       [facility] Inhibitors Refill Protocol Failed - 8/10/2019  9:21 AM        Failed - Serum Potassium in past 12 months     No results found for: LN-POTASSIUM          Failed - Serum Creatinine in past 12 months     Creatinine   Date Value Ref Range Status   07/31/2018 0.77 0.60 - 1.10 mg/dL Final             Passed - PCP or prescribing provider visit in past 12 months       Last office visit with prescriber/PCP: 5/15/2019 Jami Mcgarry MD OR same dept: 5/15/2019 Jami Mcgarry MD OR same specialty: 5/15/2019 Jami Mcgarry MD  Last physical: 7/25/2018 Last MTM visit: Visit date not found   Next visit within 3 mo: Visit date not found  Next physical within 3 mo: Visit date not found  Prescriber OR PCP: Jami Mcgarry MD  Last diagnosis associated with med order: There are no diagnoses linked to this encounter.  If protocol passes may refill for 12 months if within 3 months of last provider visit (or a total of 15 months).             Passed - Blood pressure filed in past 12 months     BP Readings from Last 1 Encounters:   05/15/19 123/78

## 2021-06-01 VITALS — BODY MASS INDEX: 26.22 KG/M2 | WEIGHT: 157.56 LBS

## 2021-06-01 VITALS — HEIGHT: 65 IN | WEIGHT: 157.31 LBS | BODY MASS INDEX: 26.21 KG/M2

## 2021-06-01 VITALS — WEIGHT: 165.44 LBS | BODY MASS INDEX: 27.53 KG/M2

## 2021-06-01 NOTE — PATIENT INSTRUCTIONS - HE
Lumbar spine MRI and brain MRI.    If abnormal spinw MRI, to spine clinic.    If normal spine MRI or abnormal brain, to see Neurology.     If needind can see ENT for decreased smell.      Health Maintenance   Topic Date Due     HEPATITIS C SCREENING  NA     HIV SCREENING  NA     ZOSTER VACCINES (1 of 2) If you are interested in the new shingles shot, Shingrix, please check with insurance for coverage either in clinic or at a pharmacy. It is a 2 shot series 2-6 months apart.      PREVENTIVE CARE VISIT  Yearly     INFLUENZA VACCINE RULE BASED (1) Today     MAMMOGRAM  01/29/2020     COLONOSCOPY  10/23/2022,  sooner if blood in stool, change in bowel habits, or abdominal pain     PAP SMEAR  07/25/2021     HPV TEST  07/25/2021     ADVANCE CARE PLANNING  Packet given     TD 18+ HE  07/25/2028     TDAP ADULT ONE TIME DOSE  Completed     Dexa scan-Ordered
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
## 2021-06-02 VITALS — WEIGHT: 157.25 LBS | BODY MASS INDEX: 26.17 KG/M2

## 2021-06-02 NOTE — TELEPHONE ENCOUNTER
Test Results  Who is calling?:  Patient   Who ordered the test:  MR Brain/ MRI : L- Spine  Type of test: Imaging  Date of test:  10/02/19  Where was the test performed:  [facility]  What are your questions/concerns?:  Patient would like results of imaging. Please can and or leave detailed message.  Okay to leave a detailed message?:  Yes

## 2021-06-03 VITALS
BODY MASS INDEX: 26.55 KG/M2 | HEIGHT: 65 IN | DIASTOLIC BLOOD PRESSURE: 76 MMHG | HEART RATE: 68 BPM | TEMPERATURE: 96.4 F | WEIGHT: 159.38 LBS | SYSTOLIC BLOOD PRESSURE: 128 MMHG | RESPIRATION RATE: 16 BRPM

## 2021-06-03 VITALS — WEIGHT: 157.38 LBS | BODY MASS INDEX: 26.19 KG/M2

## 2021-06-03 VITALS — HEIGHT: 65 IN | BODY MASS INDEX: 27.32 KG/M2 | WEIGHT: 164 LBS

## 2021-06-03 NOTE — PROGRESS NOTES
Patient presents for bilateral lower extremity EMG.  She has bilateral foot numbness and tingling all the toes up to the metatarsal arch in the left lateral shin.  She does have low back pain as well.  He has not started physical therapy or dietary supplements.  Has had borderline blood glucose.  No muscle atrophy of the legs on exam.    EMG/NCS  results: Please see scanned full report.    Comment NCS: Abnormal study  1.  low amplitude bilateral sural SNAPs.  2.  Normal bilateral lower extremity CMAPs, right radial SNAP and ulnar CMAP.    Comment EMG: Normal study  1.  Normal needle EMG bilateral lower extremities.    Interpretation: Abnormal study: There is electrodiagnostic evidence of:    1.  Low amplitude bilateral sural SNAPs.  This can be normal for the patient, however this also could represent a sensory or sensorimotor peripheral polyneuropathy, primarily axonal, mild in severity.    2. There is no electrodiagnostic evidence of lumbosacral radiculopathy, lumbosacral plexopathy, or focal neuropathy in the bilateral lower extremities.    Note: The patient will continue to follow with PCP for management of blood sugar levels.  We will start physical therapy for the lumbar spine as previously planned as well as start B12 supplementation recently discussed to see if that will assist in her foot paresthesias.  Follow-up in 1 month.    The testing was completed in its entirety by the physician.      It was our pleasure caring for your patient today, if there any questions or concerns please do not hesitate to contact us.
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
## 2021-06-04 VITALS — WEIGHT: 165 LBS | HEIGHT: 65 IN | BODY MASS INDEX: 27.49 KG/M2

## 2021-06-04 VITALS
TEMPERATURE: 98.5 F | DIASTOLIC BLOOD PRESSURE: 83 MMHG | BODY MASS INDEX: 27.07 KG/M2 | OXYGEN SATURATION: 97 % | WEIGHT: 162.5 LBS | HEIGHT: 65 IN | SYSTOLIC BLOOD PRESSURE: 135 MMHG | RESPIRATION RATE: 16 BRPM | HEART RATE: 71 BPM

## 2021-06-04 VITALS — HEIGHT: 65 IN | BODY MASS INDEX: 27.49 KG/M2 | WEIGHT: 165 LBS

## 2021-06-04 VITALS
HEART RATE: 68 BPM | DIASTOLIC BLOOD PRESSURE: 81 MMHG | TEMPERATURE: 97.3 F | WEIGHT: 164.38 LBS | RESPIRATION RATE: 16 BRPM | SYSTOLIC BLOOD PRESSURE: 128 MMHG | BODY MASS INDEX: 27.57 KG/M2
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
## 2021-06-04 NOTE — TELEPHONE ENCOUNTER
Refill Approved    Rx renewed per Medication Renewal Policy. Medication was last renewed on 6/20/19.    Samantha Sapp, [facility] Triage/Med Refill 12/6/2019     Requested Prescriptions   Pending Prescriptions Disp Refills     PREMPRO 0.3-1.5 mg per tablet [Pharmacy Med Name: PREMPRO 0.3-1.5MG TAB] 90 tablet 1     Sig: TAKE 1 TABLET BY MOUTH ONCE DAILY       Hormone Replacement Therapy Refill Protocol Passed - 12/4/2019 11:19 AM        Passed - PCP or prescribing provider visit in past 12 months       Last office visit with prescriber/PCP: 5/15/2019 Jami Mcgarry MD OR same dept: 5/15/2019 Jami Mcgarry MD OR same specialty: 5/15/2019 Jami Mcgarry MD  Last physical: 10/2/2019 Last MTM visit: Visit date not found     Next visit within 3 mo: Visit date not found  Next physical within 3 mo: Visit date not found  Prescriber OR PCP: Jami Mcgarry MD  Last diagnosis associated with med order: 1. Hormone replacement therapy (HRT)  - PREMPRO 0.3-1.5 mg per tablet [Pharmacy Med Name: PREMPRO 0.3-1.5MG TAB]; TAKE 1 TABLET BY MOUTH ONCE DAILY  Dispense: 90 tablet; Refill: 1     If protocol passes may refill for 3 months.
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
## 2021-06-05 VITALS
BODY MASS INDEX: 26.37 KG/M2 | WEIGHT: 158.25 LBS | RESPIRATION RATE: 16 BRPM | SYSTOLIC BLOOD PRESSURE: 112 MMHG | HEART RATE: 74 BPM | DIASTOLIC BLOOD PRESSURE: 78 MMHG | TEMPERATURE: 97.4 F | HEIGHT: 65 IN
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
## 2021-06-06 NOTE — PROGRESS NOTES
[facility] Note                                                        Date of Visit: 2/29/2020     Chief Complaint   Ursula Pedro is a(n) 59 y.o. White or  female who presents to [facility] with the following complaint(s):  Sore Throat (x1d, couldn't sleep last night because of pain)       Assessment and Plan   1. Uvulitis  - amoxicillin-clavulanate (AUGMENTIN) 875-125 mg per tablet; Take 1 tablet by mouth 2 (two) times a day for 10 days.  Dispense: 20 tablet; Refill: 0  - predniSONE (DELTASONE) 20 MG tablet; Take 40 mg by mouth daily for 5 days Take with food..  Dispense: 10 tablet; Refill: 0    2. Throat pain  - Rapid Strep A Screen-Throat  - Group A Strep, RNA Direct Detection, Throat      Patient presenting with 2-day history of uvulitis following an apparent viral URI. Strep screen is negative. Reflex strep testing is in process. Patient does take low-dose lisinopril chronically, but she does not have other signs / symptoms of angioedema. Treating as as infectious uvulitis with Augmentin and prednisone as listed above. Recommended use of a probiotic while taking this antibiotic. Discussed symptomatic/supportive cares, including rest, hydration, and use of over-the-counter acetaminophen as needed to manage fever and discomfort.     Counseled patient regarding assessment and plan for evaluation and treatment. Questions were answered. See AVS for the specific written instructions and educational handout(s) regarding uvulitis that were provided at the conclusion of the visit.     Discussed signs / symptoms that warrant urgent / emergent medical attention.     Follow up within 3 days if not improving.      History of Present Illness   Primary symptom: Flu / Cold / Cough  Onset: 10 days ago  Progression: Persisting  Fevers: Low-grade  Chills: Mild  Sore throat: This was the original symptom and resolved after several days but subsequently recurred 2 days ago.   Nasal congestion: Yes, but has minimal  "sinus pressure.   Rhinorrhea: Yes, clear to thick yellow.   Ear pain: Fullness bilaterally.   Headache: Yes  Body aches: Initially  Cough: Yes, dry  Shortness of breath: No  GI symptoms: Nausea and loose stools.   Additional symptoms: Uvula is swollen.   Home therapies utilized: DayQuil, NyQuil, and ibuprofen.   Underlying lung disease: No  Exposure to influenza: No  Exposure to strep: No  Recent travel: No     Review of Systems   Review of Systems   All other systems reviewed and are negative.       Physical Exam   Vitals:    02/29/20 1303   BP: 135/83   Patient Site: Right Arm   Patient Position: Sitting   Cuff Size: Adult Regular   Pulse: 71   Resp: 16   Temp: 98.5  F (36.9  C)   TempSrc: Oral   SpO2: 97%   Weight: 162 lb 8 oz (73.7 kg)   Height: 5' 4.75\" (1.645 m)     Physical Exam  Vitals signs and nursing note reviewed.   Constitutional:       General: She is not in acute distress.     Appearance: She is well-developed and normal weight. She is not ill-appearing, toxic-appearing or diaphoretic.   HENT:      Head: Normocephalic and atraumatic.      Right Ear: Tympanic membrane, ear canal and external ear normal.      Left Ear: Tympanic membrane, ear canal and external ear normal.      Nose: Mucosal edema present. No rhinorrhea.      Right Sinus: No maxillary sinus tenderness or frontal sinus tenderness.      Left Sinus: No maxillary sinus tenderness or frontal sinus tenderness.      Mouth/Throat:      Lips: Pink. No lesions.      Mouth: Mucous membranes are moist. No oral lesions.      Tongue: No lesions.      Palate: No lesions.      Pharynx: Uvula midline. Posterior oropharyngeal erythema and uvula swelling (with fine papular erythema) present. No oropharyngeal exudate.      Tonsils: No tonsillar exudate. 1+ on the right. 1+ on the left.   Eyes:      General: Lids are normal.      Conjunctiva/sclera: Conjunctivae normal.   Neck:      Musculoskeletal: Neck supple. No edema or erythema.   Cardiovascular:      " Rate and Rhythm: Normal rate and regular rhythm.      Heart sounds: S1 normal and S2 normal. No murmur. No friction rub. No gallop.    Pulmonary:      Effort: Pulmonary effort is normal.      Breath sounds: Normal breath sounds. No stridor. No wheezing, rhonchi or rales.   Lymphadenopathy:      Cervical: No cervical adenopathy.   Skin:     General: Skin is warm and dry.      Coloration: Skin is not pale.      Findings: No rash.   Neurological:      General: No focal deficit present.      Mental Status: She is alert and oriented to person, place, and time.          Diagnostic Studies   Laboratory:  Results for orders placed or performed in visit on 02/29/20   Rapid Strep A Screen-Throat   Result Value Ref Range    Rapid Strep A Antigen No Group A Strep detected, presumptive negative No Group A Strep detected, presumptive negative     Radiology:  N/A  Electrocardiogram:  N/A     Procedure Note   N/A     Pertinent History   The following portions of the patient's history were reviewed and updated as appropriate: allergies, current medications, past family history, past medical history, past social history, past surgical history and problem list.    Patient has Allergic Rhinitis; Oral Allergy Syndrome; Basal Cell Carcinoma Of The Skin; Adiposity; Hypertension; Female Stress Incontinence; Benign Polyps Of The Large Intestine; Mammogram - Abnormal; BMI 30.0-30.9,adult; Prediabetes; Hypercholesteremia; Family history of osteoporosis; Vitamin D deficiency; and Decreased sense of smell on their problem list.    Patient has a past medical history of Breast cyst (2012), Hypertension, and Skin cancer (2012).    Patient has a past surgical history that includes Breast biopsy (Right, 2013).    Patient's family history includes Breast cancer (age of onset: 90) in her maternal grandmother; Colon cancer in her brother; Diabetes in her father and paternal grandfather; Hypertension in her father and mother; Liver cancer in her  brother; Osteoporosis in her maternal grandmother; Pancreatic cancer in her paternal grandmother; Skin cancer in her father; Stroke in her maternal grandmother.    Patient reports that she has quit smoking. She smoked 0.00 packs per day. She has never used smokeless tobacco. She reports current alcohol use.     Portions of this note have been dictated using voice recognition software. Any grammatical or contextual distortions are unintentional and inherent to the software.    Adonis Collins MD  [facility]

## 2021-06-06 NOTE — TELEPHONE ENCOUNTER
Refill Request  Did you contact pharmacy: No  Medication name:   Requested Prescriptions     Pending Prescriptions Disp Refills     estrogen, conjugated,-medroxyPROGESTERone (PREMPRO) 0.3-1.5 mg per tablet 90 tablet 0     Sig: Take 1 tablet by mouth daily.     Who prescribed the medication: Jami Mcgarry MD  Requested Pharmacy: [facility]  Is patient out of medication: No.  1 days left  Patient notified refills processed in 3 business days:  Yes  Okay to leave a detailed message: No

## 2021-06-06 NOTE — TELEPHONE ENCOUNTER
Refill Approved    Rx renewed per Medication Renewal Policy. Medication was last renewed on 12/6/19.    Samantha Sapp, [facility] Triage/Med Refill 3/4/2020     Requested Prescriptions   Pending Prescriptions Disp Refills     estrogen, conjugated,-medroxyPROGESTERone (PREMPRO) 0.3-1.5 mg per tablet 90 tablet 0     Sig: Take 1 tablet by mouth daily.       Hormone Replacement Therapy Refill Protocol Passed - 3/4/2020  9:41 AM        Passed - PCP or prescribing provider visit in past 12 months       Last office visit with prescriber/PCP: 5/15/2019 Jami Mcgarry MD OR same dept: 5/15/2019 Jami Mcgarry MD OR same specialty: 5/15/2019 Jami Mcgarry MD  Last physical: 10/2/2019 Last MTM visit: Visit date not found     Next visit within 3 mo: Visit date not found  Next physical within 3 mo: Visit date not found  Prescriber OR PCP: Jami Mcgarry MD  Last diagnosis associated with med order: 1. Hormone replacement therapy (HRT)  - estrogen, conjugated,-medroxyPROGESTERone (PREMPRO) 0.3-1.5 mg per tablet; Take 1 tablet by mouth daily.  Dispense: 90 tablet; Refill: 0     If protocol passes may refill for 3 months.

## 2021-06-06 NOTE — TELEPHONE ENCOUNTER
Refill Approved    Rx renewed per Medication Renewal Policy. Medication was last renewed on 8/10/19  #90 R-2.    Last OV 10/2/19    Maureen Mota, [facility] Triage/Med Refill 3/8/2020     Requested Prescriptions   Pending Prescriptions Disp Refills     lisinopriL (PRINIVIL,ZESTRIL) 5 MG tablet 90 tablet 2     Sig: Take 1 tablet (5 mg total) by mouth daily.       Ace Inhibitors Refill Protocol Passed - 3/8/2020 11:35 AM        Passed - PCP or prescribing provider visit in past 12 months       Last office visit with prescriber/PCP: 5/15/2019 Jami Mcgarry MD OR same dept: 5/15/2019 Jami Mcgarry MD OR same specialty: 5/15/2019 Jami Mcgarry MD  Last physical: 10/2/2019 Last MTM visit: Visit date not found   Next visit within 3 mo: Visit date not found  Next physical within 3 mo: Visit date not found  Prescriber OR PCP: Jami Mcgarry MD  Last diagnosis associated with med order: 1. Hypertension  - lisinopriL (PRINIVIL,ZESTRIL) 5 MG tablet; Take 1 tablet (5 mg total) by mouth daily.  Dispense: 90 tablet; Refill: 2    If protocol passes may refill for 12 months if within 3 months of last provider visit (or a total of 15 months).             Passed - Serum Potassium in past 12 months     Lab Results   Component Value Date    Potassium 4.5 10/02/2019             Passed - Blood pressure filed in past 12 months     BP Readings from Last 1 Encounters:   02/29/20 135/83             Passed - Serum Creatinine in past 12 months     Creatinine   Date Value Ref Range Status   10/02/2019 0.79 0.60 - 1.10 mg/dL Final

## 2021-06-07 NOTE — TELEPHONE ENCOUNTER
Refill Approved    Rx renewed per Medication Renewal Policy. Medication was last renewed on 3/4/20.    Terri Vu, [facility] Triage/Med Refill 4/1/2020     Requested Prescriptions   Pending Prescriptions Disp Refills     PREMPRO 0.3-1.5 mg per tablet [Pharmacy Med Name: PREMPRO 0.3-1.5MG TABS] 56 tablet 0     Sig: TAKE ONE TABLET BY MOUTH ONCE DAILY       Hormone Replacement Therapy Refill Protocol Passed - 3/30/2020  3:30 PM        Passed - PCP or prescribing provider visit in past 12 months       Last office visit with prescriber/PCP: 5/15/2019 Jami Mcgarry MD OR same dept: 5/15/2019 Jami Mcgarry MD OR same specialty: 5/15/2019 Jami Mcgarry MD  Last physical: 10/2/2019 Last MTM visit: Visit date not found     Next visit within 3 mo: Visit date not found  Next physical within 3 mo: Visit date not found  Prescriber OR PCP: Jami Mcgarry MD  Last diagnosis associated with med order: 1. Hormone replacement therapy (HRT)  - PREMPRO 0.3-1.5 mg per tablet [Pharmacy Med Name: PREMPRO 0.3-1.5MG TABS]; TAKE ONE TABLET BY MOUTH ONCE DAILY  Dispense: 56 tablet; Refill: 0     If protocol passes may refill for 3 months.

## 2021-06-07 NOTE — PROGRESS NOTES
[facility] Discharge Summary  Patient Name: Ursula Pedro  Date: 4/6/2020  Referral Diagnosis:   Lumbar spine pain [M54.5]  - Primary       DDD (degenerative disc disease), lumbar [M51.36]       Lumbar spondylosis [M47.816]       Referring provider: Familia Carballo DO  Visit Diagnosis:   1. Chronic midline low back pain without sciatica     2. Weakness of back     3. Chronic pain of both shoulders     4. Poor posture         Goals:  Pt. will be independent with home exercise program in : 4 weeks Met  Pt. will report decreased intensity, frequency of : Pain;in 4 weeks;Comment  Comment:: 50% Met  Pt will: demonstrate lumbar MEDX strength at or above age matched norms within 8 weeks Not tested    Patient was seen for 5 visits from 12/11/2019 to 1/15/2020 with - missed appointments.    The patient met goals and has demonstrated understanding of and independence in the home program for self-care, and progression to next steps.  She will initiate contact if questions or concerns arise.    Therapy will be discontinued at this time.  The patient will need a new referral to resume.    Thank you for your referral.  Bryce GUZMAN, PT  4/6/2020  12:52 PM

## 2021-06-08 NOTE — PROGRESS NOTES
"Assessment and Plan:     BMI 30.0-30.9,adult  56 y.o. year old female in clinic today to discuss treatment of the following conditions through diet and lifestyle modification and weight loss:  1. BMI 30.0-30.9,adult    2. Essential hypertension with goal blood pressure less than 140/90    3. Female stress incontinence    4. Prediabetes    5. Hypercholesteremia      The patient's efforts this past month were successful as evidenced by her weight loss as well as feeling well.  Energy is increased.  She is making healthy choices.  I am recommending that she increase her exercise as well as strategize her food choices during times of illness/stress.  Follow-up in 1 month.  The patient tolerated phentermine and is using this medication to facilitate lysed all changes.  No contraindication to continuing.      Follow up in 1 month.  Continue medications.  Continue supplements.    Recommend increasing movement throughout the day--sitting less, moving more.  Will increase activity over time to reach a goal of at least 150 minutes of moderate exercise each week.  Behavior modification:  Cognitive restructuring exercises, journaling stressors, triggers for food cravings.  Dietary Intervention:  Reduced calorie, reduced carbohydrates, whole food diet.  Greater than 50% of this 15 minute visit was spent in counseling regarding patient's obesity, medications, dietary, exercise, and behavior modification recommendations.      Subjective  Patient presents for treatment of chronic, comorbid conditions using intensive therapeutic lifestyle interventions including nutrition, physical activity, and behavior management.    \"dieting part\"  Was sick and struggled to make protein based choices  Did not exercise as much as she thought that she would  Likes her food choices  No hunger.    Are you experiencing any side effects to the medications:  No  Hunger control:  good  Exercise was discussed: yes  Taking supplements:  yes  Discussed " journaling food:  yes  Patient is pleased with the current results:  yes  The patient is following the nutrition plan:  yes  Barriers to losing weight:  Behavior:  inadequate exercise    Patient Active Problem List   Diagnosis     Allergic Rhinitis     Oral Allergy Syndrome     Basal Cell Carcinoma Of The Skin     Overweight     Hypertension     Female Stress Incontinence     Benign Polyps Of The Large Intestine     Mammogram - Abnormal     BMI 30.0-30.9,adult     Prediabetes     Hypercholesteremia       Current Outpatient Prescriptions on File Prior to Visit   Medication Sig Dispense Refill     cholecalciferol, vitamin D3, (VITAMIN D3) 2,000 unit cap Take 2,000 Units by mouth daily.       DOCOSAHEXANOIC ACID/EPA (FISH OIL ORAL) Take 1,200 mg by mouth.       metFORMIN (GLUCOPHAGE XR) 500 MG 24 hr tablet Take 1 tablet (500 mg total) by mouth daily with supper. 90 tablet 1     triamcinolone (KENALOG) 0.1 % cream Apply to affected skin rash TID 45 g 0     [DISCONTINUED] phentermine 30 MG capsule Take 1 capsule (30 mg total) by mouth every morning. 30 capsule 0     No current facility-administered medications on file prior to visit.        Objective:  Vitals:    02/15/17 1108   BP: 118/60   Pulse: 84     Initial Weight: 163 lbs  Weight: 160 lb 3.2 oz (72.7 kg)  Weight loss from initial: 0  % Weight loss: 0 %  Waist Circumference: 39.5 inches  Neck Circumference: 15 inches  Body mass index is 26.66 kg/(m^2).  General:  Patient is alert, pleasant, no distress.  Patient is obese.    This note has been dictated using voice recognition software. Any grammatical or context distortions are unintentional and inherent to the software

## 2021-06-08 NOTE — PROGRESS NOTES
"Assessment and Plan:     BMI 30.0-30.9,adult  56 y.o. year old female in clinic today to discuss treatment of the following conditions through diet and lifestyle modification and weight loss:  1. BMI 30.0-30.9,adult    2. Prediabetes    3. Female stress incontinence    4. Essential hypertension with goal blood pressure less than 140/90      The patient believes that she is lost control of her approach to eating in the 7 months and she is most recently been in clinic.  She has gained much of the weight that she lost back and would like to restart the program.  Specifically, she is looking for structure and accountability.  No contraindication to resuming phentermine.  Additionally, no contra indication to resuming metformin.  Refill sent to pharmacy.  She will follow-up with us in 1 month.     Follow up in 1 month.  Continue medications.  Continue supplements.  Resume phentermine, refill given today.  Recommend increasing movement throughout the day--sitting less, moving more.  Will increase activity over time to reach a goal of at least 150 minutes of moderate exercise each week.  Behavior modification:  Cognitive restructuring exercises, journaling stressors, triggers for food cravings.  Dietary Intervention:  Reduced calorie, reduced carbohydrates, whole food diet.  Greater than 50% of this 15 minute visit was spent in counseling regarding patient's obesity, medications, dietary, exercise, and behavior modification recommendations.      Subjective  Patient presents for treatment of chronic, comorbid conditions using intensive therapeutic lifestyle interventions including nutrition, physical activity, and behavior management.    Job changed, got off track  Logging is helpful  Accountability of coming to clinic is helpful  medications were helpful.  \"snacker\" - chocolate  Exercise: neal 1-2x/week.  Body flow 2x/week.  Bar class 2x/week.    Are you experiencing any side effects to the medications:  No  Hunger " control:  poor  Exercise was discussed: yes  Taking supplements:  yes  Discussed journaling food:  yes  Patient is pleased with the current results:  no  The patient is following the nutrition plan:  no  Barriers to losing weight:  Social calories    Patient Active Problem List   Diagnosis     Allergic Rhinitis     Oral Allergy Syndrome     Basal Cell Carcinoma Of The Skin     Overweight     Hypertension     Female Stress Incontinence     Benign Polyps Of The Large Intestine     Mammogram - Abnormal     BMI 30.0-30.9,adult     Prediabetes       Current Outpatient Prescriptions on File Prior to Visit   Medication Sig Dispense Refill     cholecalciferol, vitamin D3, (VITAMIN D3) 2,000 unit cap Take 2,000 Units by mouth daily.       DOCOSAHEXANOIC ACID/EPA (FISH OIL ORAL) Take 1,200 mg by mouth.       triamcinolone (KENALOG) 0.1 % cream Apply to affected skin rash TID 45 g 0     [DISCONTINUED] phentermine 30 MG capsule Take 1 capsule (30 mg total) by mouth every morning. 30 capsule 0     No current facility-administered medications on file prior to visit.      Objective:  Vitals:    01/18/17 1025   BP: 132/84   Pulse: 70     Weight: 163 lb (73.9 kg)  Body mass index is 27.12 kg/(m^2).  General:  Patient is alert, pleasant, no distress.  Patient is obese.    This note has been dictated using voice recognition software. Any grammatical or context distortions are unintentional and inherent to the software

## 2021-06-08 NOTE — PROGRESS NOTES
[facility] Discharge Summary  Patient Name: Ursula Pedro  Date: 3/15/2017  Referral Diagnosis: Mixed Incontinence  Referring provider: Trini Hernandes FNP  Visit Diagnosis:   1. Muscle weakness (generalized)     2. Mixed incontinence         Goals:  Pt. will demonstrate/verbalize independence in self-management of condition in : 12 weeks;Met  Pt. will be independent with home exercise program in : 12 weeks;Met  Patient will have improved pelvic floor awareness/coordination of contraction: in 12-16 weeks;Met  Patient will experience no leakage with coughing, sneezing, jumping : in 12-16 weeks;Met  Patient will increase pelvic floor strength : ___/5;to decrease urinary frequency to no more than every _ hours;to decrease episodes of urinary incontinence to _/week;in 12-16 weeks;Met  Patient will increase pelvic floor strength : 3  urinary frequency decrease to no more than every __ hours: 2  decrease episodes of urinary incontinence to no more than _ / week: 4    Patient was seen for 2 visits from 12/21/16 to 1/18/17 with 0 missed appointments.  Chart was held for 30 days with no contact by the patient, per last note below pt will be discharged with I HEP at this time.    Therapy will be discontinued at this time.  The patient will need a new referral to resume.    Thank you for your referral.  Frances Brizuela PT  3/15/2017  10:36 AM    [facility] Daily Progress / Discharge Summary    Patient Name: Ursula Pedro  Date: 1/18/2017  Visit #: 2  Referral Diagnosis: Mixed Incontinence   Referring provider: Trini Hernandes FNP  Visit Diagnosis:     ICD-10-CM    1. Muscle weakness (generalized) M62.81    2. Mixed incontinence N39.46          Assessment:   HEP/POC compliance is  fair . Per pt account.    Pt has met her PT goals at this time. Both PT and pt agree she should continue to do well with I HEP at this time.    Goal Status:  Pt. will demonstrate/verbalize independence in self-management of  condition in : 12 weeks;Met  Pt. will be independent with home exercise program in : 12 weeks;Met  Patient will have improved pelvic floor awareness/coordination of contraction: in 12-16 weeks;Met  Patient will experience no leakage with coughing, sneezing, jumping : in 12-16 weeks;Met  Patient will increase pelvic floor strength : ___/5;to decrease urinary frequency to no more than every _ hours;to decrease episodes of urinary incontinence to _/week;in 12-16 weeks;Met  Patient will increase pelvic floor strength : 3  urinary frequency decrease to no more than every __ hours: 2  decrease episodes of urinary incontinence to no more than _ / week: 4    Plan / Patient Education:     Initial plan of care has been completed. Patient has responded appropriately to skilled PT intervention.  The patient met goals and has demonstrated understanding of/independence in the home program for self-care and progression to next steps.  PT to hold chart for 30 days in case of issues. If no contact by pt in this time frame then chart will be d/c with I HEP at that time.    Subjective:     Pt reports she did very well with her HEP the first 2.5 after the eval. Pt states then was busy with some unexpected happenings and did not do a lot of her HEP.  Pt does note she is voiding consistently every 2-3 hrs during the day. Pt now notes on occasion is up 1x at night especially if she had a lot to drink before before.  Pt with min to no leakage with ex at this time.  Pt feels she can cont with I HEP at this time.    Objective:     No tenderness is noted with internal palpation. Pt now with good coordination and awareness of the pelvic floor muscle contraction this date.    Pelvic floor strength now 3 to 3+/5.      Treatment Today  TREATMENT MINUTES COMMENTS   Evaluation     Self-care/ Home management     Manual therapy     Neuromuscular Re-education     Therapeutic Activity     Therapeutic Exercises 20 Reassessed pelvic floor and advanced  HEP, Discussed maintenance of HEP   Gait training     Modality__________________                Total 20    Blank areas are intentional and mean the treatment did not include these items.     Frances Brizuela PT  1/18/2017

## 2021-06-09 NOTE — PROGRESS NOTES
"Assessment and Plan:     BMI 30.0-30.9,adult  57 y.o. year old female in clinic today to discuss treatment of the following conditions through diet and lifestyle modification and weight loss:  1. BMI 30.0-30.9,adult    2. Hypercholesteremia    3. Essential hypertension with goal blood pressure less than 140/90    4. Female stress incontinence    5. Prediabetes      The patient's efforts this past month were successful as evidenced by weight loss and improvement in energy.  I recommend that the patient focus on continuing to expand exercise and continue to journal.   - start biking.   - continue journaling   - continue metformin, phentermine   - fasting labs when able.      Follow up in 1 month.  Continue medications.  Continue supplements.    Recommend increasing movement throughout the day--sitting less, moving more.  Will increase activity over time to reach a goal of at least 150 minutes of moderate exercise each week.  Behavior modification:  Cognitive restructuring exercises, journaling stressors, triggers for food cravings.  Dietary Intervention:  Reduced calorie, reduced carbohydrates, whole food diet.  Greater than 50% of this 15 minute visit was spent in counseling regarding patient's obesity, medications, dietary, exercise, and behavior modification recommendations.      Subjective  Patient presents for treatment of chronic, comorbid conditions using intensive therapeutic lifestyle interventions including nutrition, physical activity, and behavior management.    Working out 4x per week ([employer])  journaling - \"feels more on track\"  Energy is good.  Did not have fasting labwork done.    Are you experiencing any side effects to the medications:  No  Hunger control:  good  Exercise was discussed: yes  Taking supplements:  yes  Discussed journaling food:  yes  Patient is pleased with the current results:  yes  The patient is following the nutrition plan:  yes  Barriers to losing weight:  Behavior:  inadequate " exercise    Patient Active Problem List   Diagnosis     Allergic Rhinitis     Oral Allergy Syndrome     Basal Cell Carcinoma Of The Skin     Adiposity     Hypertension     Female Stress Incontinence     Benign Polyps Of The Large Intestine     Mammogram - Abnormal     BMI 30.0-30.9,adult     Prediabetes     Hypercholesteremia       Current Outpatient Prescriptions on File Prior to Visit   Medication Sig Dispense Refill     cholecalciferol, vitamin D3, (VITAMIN D3) 2,000 unit cap Take 2,000 Units by mouth daily.       DOCOSAHEXANOIC ACID/EPA (FISH OIL ORAL) Take 1,200 mg by mouth.       metFORMIN (GLUCOPHAGE XR) 500 MG 24 hr tablet Take 1 tablet (500 mg total) by mouth daily with supper. 90 tablet 1     [DISCONTINUED] triamcinolone (KENALOG) 0.1 % cream Apply to affected skin rash TID 45 g 0     No current facility-administered medications on file prior to visit.        Objective:  Vitals:    03/22/17 1100   BP: 138/78   Pulse: 100     Initial Weight: 163 lbs  Weight: 153 lb (69.4 kg)  Weight loss from initial: 10  % Weight loss: 6.13 %  Body mass index is 25.46 kg/(m^2).  General:  Patient is alert, pleasant, no distress.  Patient is obese.    This note has been dictated using voice recognition software. Any grammatical or context distortions are unintentional and inherent to the software

## 2021-06-10 NOTE — TELEPHONE ENCOUNTER
Refill Approved    Rx renewed per Medication Renewal Policy. Medication was last renewed on 8/30/19.    Terri Vu, [facility] Triage/Med Refill 8/20/2020     Requested Prescriptions   Pending Prescriptions Disp Refills     citalopram (CELEXA) 20 MG tablet [Pharmacy Med Name: CITALOPRAM HYDROBROMIDE 20MG TABS] 90 tablet 1     Sig: TAKE ONE TABLET BY MOUTH ONCE DAILY       [facility] Refill Protocol  Passed - 8/19/2020  5:02 AM        Passed - PCP or prescribing provider visit in last year     Last office visit with prescriber/PCP: 7/29/2020 Jami Mcgarry MD OR same dept: 7/29/2020 Jami Mcgarry MD OR same specialty: 7/29/2020 Jami Mcgarry MD  Last physical: 10/2/2019 Last MTM visit: Visit date not found   Next visit within 3 mo: Visit date not found  Next physical within 3 mo: Visit date not found  Prescriber OR PCP: Jami Mcgarry MD  Last diagnosis associated with med order: 1. Mood change  - citalopram (CELEXA) 20 MG tablet [Pharmacy Med Name: CITALOPRAM HYDROBROMIDE 20MG TABS]; TAKE ONE TABLET BY MOUTH ONCE DAILY  Dispense: 90 tablet; Refill: 1    If protocol passes may refill for 12 months if within 3 months of last provider visit (or a total of 15 months).

## 2021-06-10 NOTE — PATIENT INSTRUCTIONS - HE
Restart Phentermine 30 mg daily.  #30 and 2 refills. Take in morning.    To restart tracking calories and macronutrients.    Continue exercise 3-5 times a week.    Set physical for Wednesday, October 7 at 10:40. Please come fasting.    COVID antibodies since health care worker.

## 2021-06-10 NOTE — TELEPHONE ENCOUNTER
Refill Approved    Rx renewed per Medication Renewal Policy. Medication was last renewed on 4/1/20, last OV 7/29/20.    Shari Wagner, [facility] Triage/Med Refill 8/11/2020     Requested Prescriptions   Pending Prescriptions Disp Refills     PREMPRO 0.3-1.5 mg per tablet [Pharmacy Med Name: PREMPRO 0.3-1.5MG TABS] 84 tablet 0     Sig: TAKE ONE TABLET BY MOUTH ONCE DAILY       Hormone Replacement Therapy Refill Protocol Passed - 8/10/2020  8:50 AM        Passed - PCP or prescribing provider visit in past 12 months       Last office visit with prescriber/PCP: 7/29/2020 Jami Mcgarry MD OR same dept: 7/29/2020 Jami Mcgarry MD OR same specialty: 7/29/2020 Jami Mcgarry MD  Last physical: 10/2/2019 Last MTM visit: Visit date not found     Next visit within 3 mo: Visit date not found  Next physical within 3 mo: Visit date not found  Prescriber OR PCP: Jami Mcgarry MD  Last diagnosis associated with med order: 1. Hormone replacement therapy (HRT)  - PREMPRO 0.3-1.5 mg per tablet [Pharmacy Med Name: PREMPRO 0.3-1.5MG TABS]; TAKE ONE TABLET BY MOUTH ONCE DAILY  Dispense: 84 tablet; Refill: 0     If protocol passes may refill for 3 months.

## 2021-06-10 NOTE — PROGRESS NOTES
Assessment:   1. Exposure to COVID-19 virus  COVID-19 Virus (Coronavirus) Antibody & Titer Reflex    COVID-19 Virus (Coronavirus) Antibody & Titer Reflex   2. High risk medication use  phentermine 30 MG capsule       Plan:   Restart Phentermine 30 mg daily.  #30 and 2 refills. Take in morning.    To restart tracking calories and macronutrients.    Continue exercise 3-5 times a week.    Set physical for Wednesday, October 7 at 10:40. Please come fasting.    COVID antibodies since health care worker.       Subjective:  Chief Complaint   Patient presents with     Medication Management     discuss phentermine      Ursula Pedro, a 60 y.o. year old, comes in to clinic for med check.    Weight gain: Patient would like to lose weight before her son's upcoming wedding in the next few months.  Prior to the COVID pandemic she had been working out at her health club 4 to 5 hours a week.  Then her club closed and she has not been able to get in great workouts but still walking 2-1/2 to 3-1/2 miles 3-5 times a week.  No chest pain or pressure with exercise.  She been on phentermine in the past.  No side effects when she was on it such as chest pain palpitations hypertension or shortness of breath.  She has been on phentermine for about a year.  She drinks very rare caffeine just in the form of green tea.  Uses no energy drinks or Sudafed.    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  In February she was very ill with fever and respiratory symptoms.  At that point her decreased sense of taste got much worse.  It starting to improve now but she would like the COVID antibody testing.  No other concerns.    Mood: She feels her mood is overall stable.  She is currently on citalopram 20 mg.        Current Outpatient Medications   Medication Sig     cholecalciferol, vitamin D3, (VITAMIN D3) 2,000 unit cap Take 2,000 Units by mouth daily.     citalopram (CELEXA) 20 MG tablet  Take 1 tablet (20 mg total) by mouth daily.     lisinopriL (PRINIVIL,ZESTRIL) 5 MG tablet Take 1 tablet (5 mg total) by mouth daily.     melatonin 5 mg Tab tablet Take 5 mg by mouth at bedtime as needed.     PREMPRO 0.3-1.5 mg per tablet TAKE ONE TABLET BY MOUTH ONCE DAILY     b complex vitamins tablet Take 1 tablet by mouth daily.       Patient Active Problem List   Diagnosis     Allergic Rhinitis     Oral Allergy Syndrome     Basal Cell Carcinoma Of The Skin     Adiposity     Hypertension     Female Stress Incontinence     Benign Polyps Of The Large Intestine     Mammogram - Abnormal     BMI 30.0-30.9,adult     Prediabetes     Hypercholesteremia     Family history of osteoporosis     Vitamin D deficiency     Decreased sense of smell                              Objective:  /81 (Patient Site: Left Arm, Patient Position: Sitting, Cuff Size: Adult Regular)   Pulse 68   Temp 97.3  F (36.3  C) (Oral)   Resp 16   Wt 164 lb 6 oz (74.6 kg)   LMP 03/29/2018   BMI 27.57 kg/m    General: No apparent distress    Cardiovascular: Regular rate and rhythm without murmurs, rubs, or gallops  Lungs: Clear to auscultation bilaterally, no wheezes, crackles, or rhonchi

## 2021-06-10 NOTE — PROGRESS NOTES
Assessment and Plan:     BMI 30.0-30.9,adult  57 y.o. year old female in clinic today to discuss treatment of the following conditions through diet and lifestyle modification and weight loss:  1. BMI 30.0-30.9,adult    2. Prediabetes    3. Essential hypertension with goal blood pressure less than 140/90    4. Hypercholesteremia    5. Female stress incontinence    6. Adiposity      The patient's efforts this past month were successful as evidenced by weight loss despite multiple social situations.     - continue exercising   - continue phentermine and meformin   - reviewed labs.  A1c is consistent with prediabetes   - RTC one month.     Follow up in 1 month.  Continue medications.  Continue supplements.    Recommend increasing movement throughout the day--sitting less, moving more.  Will increase activity over time to reach a goal of at least 150 minutes of moderate exercise each week.  Behavior modification:  Cognitive restructuring exercises, journaling stressors, triggers for food cravings.  Dietary Intervention:  Reduced calorie, reduced carbohydrates, whole food diet.  Greater than 50% of this 15 minute visit was spent in counseling regarding patient's obesity, medications, dietary, exercise, and behavior modification recommendations.      Subjective  Patient presents for treatment of chronic, comorbid conditions using intensive therapeutic lifestyle interventions including nutrition, physical activity, and behavior management.    Lots of birthday parties.  Social calories - carbs and alcohol.  Energy is up and down.  Sleep has been variable.  Has not been biking (cold weather).  Has been working out ~300 minutes/week.  Clothes are looser.  Has been hungry a few days during the past week while not on phentermine.    Are you experiencing any side effects to the medications:  No  Hunger control:  good  Exercise was discussed: yes  Taking supplements:  yes  Discussed journaling food:  yes  Patient is pleased with  the current results:  yes  The patient is following the nutrition plan:  yes  Barriers to losing weight:  Behavior:  social calories    Patient Active Problem List   Diagnosis     Allergic Rhinitis     Oral Allergy Syndrome     Basal Cell Carcinoma Of The Skin     Adiposity     Hypertension     Female Stress Incontinence     Benign Polyps Of The Large Intestine     Mammogram - Abnormal     BMI 30.0-30.9,adult     Prediabetes     Hypercholesteremia       Current Outpatient Prescriptions on File Prior to Visit   Medication Sig Dispense Refill     cholecalciferol, vitamin D3, (VITAMIN D3) 2,000 unit cap Take 2,000 Units by mouth daily.       DOCOSAHEXANOIC ACID/EPA (FISH OIL ORAL) Take 1,200 mg by mouth.       [DISCONTINUED] metFORMIN (GLUCOPHAGE XR) 500 MG 24 hr tablet Take 1 tablet (500 mg total) by mouth daily with supper. 90 tablet 1     No current facility-administered medications on file prior to visit.      Objective:  Vitals:    05/03/17 1145   BP: 118/70   Pulse: 74     Initial Weight: 163 lbs  Weight: 151 lb 1.6 oz (68.5 kg)  Weight loss from initial: 11.9  % Weight loss: 7.3 %  Body mass index is 25.14 kg/(m^2).  General:  Patient is alert, pleasant, no distress.  Patient is obese.    This note has been dictated using voice recognition software. Any grammatical or context distortions are unintentional and inherent to the software

## 2021-06-11 NOTE — PROGRESS NOTES
Assessment and Plan:     BMI 30.0-30.9,adult  57 y.o. year old female in clinic today to discuss treatment of the following conditions through diet and lifestyle modification and weight loss:  1. BMI 30.0-30.9,adult    2. Prediabetes    3. Essential hypertension with goal blood pressure less than 140/90    4. Hypercholesteremia    5. Female stress incontinence    6. Adiposity      The patient's efforts this past month were successful as evidenced by weight stability.  I recommend that the patient focus on meal planning journaling as this will address her self sabotaging behavior.   -Continue phentermine.  Consider decreasing based on age and weight at next visit.  -Regardless of weight loss, we will obtain a body composition analysis at the next visit.  -The patient has a goal of 140 pounds.  We did discuss that she has been successful in her program and that her weight is approaching normal weight for height using BMI is a measure.  She has accomplished most of her non-quantitative goals which is feeling more energetic, increasing physical activity, eating healthier foods, resolution of hypertension.  -We will plan to check a hemoglobin A1c in 1-2 months.      Follow up in 1 month.  Continue medications.  Continue supplements.    Recommend increasing movement throughout the day--sitting less, moving more.  Will increase activity over time to reach a goal of at least 150 minutes of moderate exercise each week.  Behavior modification:  Cognitive restructuring exercises, journaling stressors, triggers for food cravings.  Dietary Intervention:  Reduced calorie, reduced carbohydrates, whole food diet.  Greater than 50% of this 15 minute visit was spent in counseling regarding patient's obesity, medications, dietary, exercise, and behavior modification recommendations.    Subjective  Patient presents for treatment of chronic, comorbid conditions using intensive therapeutic lifestyle interventions including nutrition,  "physical activity, and behavior management.    Exercise is going well.  Continues to exercise 4 days a week.   Poor journaling.  - Not journaling.  She believes that this helpful. Was great at journaling the first time she is in the program.  \"I think I might be cheating (icecream, chocolate).    Are you experiencing any side effects to the medications:  No  Hunger control:  good  Exercise was discussed: yes  Taking supplements:  yes  Discussed journaling food:  yes  Patient is pleased with the current results:  yes  The patient is following the nutrition plan:  yes  Barriers to losing weight:  Behavior:  social calories    Patient Active Problem List   Diagnosis     Allergic Rhinitis     Oral Allergy Syndrome     Basal Cell Carcinoma Of The Skin     Adiposity     Hypertension     Female Stress Incontinence     Benign Polyps Of The Large Intestine     Mammogram - Abnormal     BMI 30.0-30.9,adult     Prediabetes     Hypercholesteremia       Current Outpatient Prescriptions on File Prior to Visit   Medication Sig Dispense Refill     cholecalciferol, vitamin D3, (VITAMIN D3) 2,000 unit cap Take 2,000 Units by mouth daily.       DOCOSAHEXANOIC ACID/EPA (FISH OIL ORAL) Take 1,200 mg by mouth.       metFORMIN (GLUCOPHAGE XR) 500 MG 24 hr tablet Take 1 tablet (500 mg total) by mouth daily with supper. 90 tablet 1     [DISCONTINUED] phentermine 30 MG capsule Take 1 capsule (30 mg total) by mouth every morning. 30 capsule 0     No current facility-administered medications on file prior to visit.        Objective:  Vitals:    06/07/17 1147   BP: 120/68   Pulse: 74     Initial Weight: 163 lbs  Weight: 150 lb 9.6 oz (68.3 kg)  Weight loss from initial: 12.4  % Weight loss: 7.61 %  Body mass index is 25.06 kg/(m^2).  General:  Patient is alert, pleasant, no distress.  Patient is obese.    This note has been dictated using voice recognition software. Any grammatical or context distortions are unintentional and inherent to the " software

## 2021-06-11 NOTE — PROGRESS NOTES
Assessment and Plan:     BMI 30.0-30.9,adult  57 y.o. year old female in clinic today to discuss treatment of the following conditions through diet and lifestyle modification and weight loss:  1. BMI 30.0-30.9,adult    2. Prediabetes    3. Essential hypertension with goal blood pressure less than 140/90    4. Hypercholesteremia    5. Female stress incontinence    6. Adiposity      The patient's efforts this past month were successful as evidenced by weight loss.  I recommend that the patient focus on reducing social calories.    - continue phentermine   - continue to track   - resume exercise      Follow up in 1 month.  Continue medications.  Continue supplements.    Recommend increasing movement throughout the day--sitting less, moving more.  Will increase activity over time to reach a goal of at least 150 minutes of moderate exercise each week.  Behavior modification:  Cognitive restructuring exercises, journaling stressors, triggers for food cravings.  Dietary Intervention:  Reduced calorie, reduced carbohydrates, whole food diet.  Greater than 50% of this 15 minute visit was spent in counseling regarding patient's obesity, medications, dietary, exercise, and behavior modification recommendations.      Subjective  Patient presents for treatment of chronic, comorbid conditions using intensive therapeutic lifestyle interventions including nutrition, physical activity, and behavior management.    Had 13 days of vacation.  Reduced exercising.  Partying.  Journaling: improved.  100 carbs per day.    Sabotaging: continues  Energy: has been down.  Had a 2 week period.  Has skipped breakfast a few times.    Are you experiencing any side effects to the medications:  No  Hunger control:  good  Exercise was discussed: yes  Taking supplements:  yes  Discussed journaling food:  yes  Patient is pleased with the current results:  yes  The patient is following the nutrition plan:  yes  Barriers to losing weight:  Behavior:   social calories    Patient Active Problem List   Diagnosis     Allergic Rhinitis     Oral Allergy Syndrome     Basal Cell Carcinoma Of The Skin     Adiposity     Hypertension     Female Stress Incontinence     Benign Polyps Of The Large Intestine     Mammogram - Abnormal     BMI 30.0-30.9,adult     Prediabetes     Hypercholesteremia       Current Outpatient Prescriptions on File Prior to Visit   Medication Sig Dispense Refill     cholecalciferol, vitamin D3, (VITAMIN D3) 2,000 unit cap Take 2,000 Units by mouth daily.       DOCOSAHEXANOIC ACID/EPA (FISH OIL ORAL) Take 1,200 mg by mouth.       metFORMIN (GLUCOPHAGE XR) 500 MG 24 hr tablet Take 1 tablet (500 mg total) by mouth daily with supper. 90 tablet 1     [DISCONTINUED] phentermine 30 MG capsule Take 1 capsule (30 mg total) by mouth every morning. 30 capsule 0     No current facility-administered medications on file prior to visit.        Objective:  Vitals:    07/12/17 1138   BP: 122/84   Pulse: 72   Resp: 24   Temp: 97.7  F (36.5  C)     Weight: 148 lb 9 oz (67.4 kg)  Body mass index is 24.72 kg/(m^2).  General:  Patient is alert, pleasant, no distress.  Patient is obese.    This note has been dictated using voice recognition software. Any grammatical or context distortions are unintentional and inherent to the software

## 2021-06-12 NOTE — TELEPHONE ENCOUNTER
"Coronavirus (COVID-19) Notification    Caller Name (Patient, parent, daughter/sone, grandparent, etc)  Ursula Pedro    Reason for call  Notify of Positive Coronavirus (COVID-19) lab results, assess symptoms,  review  [facility]over[facility] [place] recommendations    Lab Result    Lab test:  2019-nCoV rRt-PCR or SARS-CoV-2 PCR    Oropharyngeal AND/OR nasopharyngeal swabs is POSITIVE for 2019-nCoV RNA/SARS-COV-2 PCR (COVID-19 virus)    RN Recommendations/Instructions per [facility] Coronavirus COVID-19 recommendations    Brief introduction script  Introduce self and then review script:  \"I am calling on behalf of [employer].  We were notified that your Coronavirus test (COVID-19) for was POSITIVE for the virus.  I have some information to relay to you but first I wanted to mention that the [place] Dept of Health will be contacting you shortly [it's possible MD already called Patient] to talk to you more about how you are feeling and other people you have had contact with who might now also have the virus.  Also, [facility] is Partnering with the [facility] for Covid-19 research, you may be contacted directly by research staff.\"    ssessment (Inquire about Patient's current symptoms)   Assessment   Current Symptoms at time of phone call: (if no symptoms, document No symptoms] Body aches   Symptom onset (if applicable) 10/26/20     If at time of call, Patients symptoms hare worsened, the Patient should contact 911 or have someone drive them to Emergency Dept promptly:      If Patient calling 911, inform 911 personal that you have tested positive for the Coronavirus (COVID-19).  Place mask on and await 911 to arrive.    If Emergency Dept, If possible, please have another adult drive you to the Emergency Dept but you need to wear mask when in contact with other people.      Review information with Patient    Your result was positive. This means you have COVID-19 (coronavirus).  We have sent you a letter that " reviews the information that I'll be reviewing with you now.    How can I protect others?    If you have symptoms: stay home and away from others (self-isolate) until:    You've had no fever--and no medicine that reduces fever--for 1 full day (24 hours). And      Your other symptoms have gotten better. For example, your cough or breathing has improved. And     At least 10 days have passed since your symptoms started. (If you ve been told by a doctor that you have a weak immune system, wait 20 days.)     If you don't have symptoms: Stay home and away from others (self-isolate) until at least 10 days have passed since your first positive COVID-19 test. (Date test collected).    During this time:    Stay in your own room, including for meals. Use your own bathroom if you can.    Stay away from others in your home. No hugging, kissing or shaking hands. No visitors.     Don't go to work, school or anywhere else.     Clean  high touch  surfaces often (doorknobs, counters, handles, etc.). Use a household cleaning spray or wipes. You'll find a full list on the EPA website at www.epa.gov/pesticide-registration/list-n-disinfectants-use-against-sars-cov-2.     Cover your mouth and nose with a mask, tissue or other face covering to avoid spreading germs.    Wash your hands and face often with soap and water.    Caregivers in these groups are at risk for severe illness due to COVID-19:  o People 65 years and older  o People who live in a nursing home or long-term care facility  o People with chronic disease (lung, heart, cancer, diabetes, kidney, liver, immunologic)  o People who have a weakened immune system, including those who:  - Are in cancer treatment  - Take medicine that weakens the immune system, such as corticosteroids  - Had a bone marrow or organ transplant  - Have an immune deficiency  - Have poorly controlled HIV or AIDS  - Are obese (body mass index of 40 or higher)  - Smoke regularly    Caregivers should wear  gloves while washing dishes, handling laundry and cleaning bedrooms and bathrooms.    Wash and dry laundry with special caution. Don't shake dirty laundry, and use the warmest water setting you can.    If you have a weakened immune system, ask your doctor about other actions you should take.    For more tips, go to www.cdc.gov/coronavirus/2019-ncov/downloads/10Things.pdf.    You should not go back to work until you meet the guidelines above for ending your home isolation. You don't need to be retested for COVID-19 before going back to work--studies show that you won't spread the virus if it's been at least 10 days since your symptoms started (or 20 days, if you have a weak immune system).    Employers: This document serves as formal notice of your employee's medical guidelines for going back to work. They must meet the above guidelines before going back to work in person.    How can I take care of myself?    1. Get lots of rest. Drink extra fluids (unless a doctor has told you not to).    2. Take Tylenol (acetaminophen) for fever or pain. If you have liver or kidney problems, ask your family doctor if it's okay to take Tylenol.     Take either:     650 mg (two 325 mg pills) every 4 to 6 hours, or     1,000 mg (two 500 mg pills) every 8 hours as needed.     Note: Don't take more than 3,000 mg in one day. Acetaminophen is found in many medicines (both prescribed and over-the-counter medicines). Read all labels to be sure you don't take too much.    For children, check the Tylenol bottle for the right dose (based on their age or weight).    3. If you have other health problems (like cancer, heart failure, an organ transplant or severe kidney disease): Call your specialty clinic if you don't feel better in the next 2 days.    4. Know when to call 911: Emergency warning signs include:    Trouble breathing or shortness of breath    Pain or pressure in the chest that doesn't go away    Feeling confused like you haven't  felt before, or not being able to wake up    Bluish-colored lips or face    5. Sign up for [employer]. We know it's scary to hear that you have COVID-19. We want to track your symptoms to make sure you're okay over the next 2 weeks. Please look for an email from [employer]--this is a free, online program that we'll use to keep in touch. To sign up, follow the link in the email. Learn more at [email or web address].    Where can I get more information?    [facility]: [email or web address]    Coronavirus Basics: www.health.[facility].[place]/diseases/coronavirus/basics.html    What to Do If You're Sick: www.cdc.gov/coronavirus/2019-ncov/about/steps-when-sick.html    Ending Home Isolation: www.cdc.gov/coronavirus/2019-ncov/hcp/disposition-in-home-patients.html     Caring for Someone with COVID-19: www.cdc.gov/coronavirus/2019-ncov/if-you-are-sick/care-for-someone.html     [facility] clinical trials (COVID-19 research studies): [email or web address]     A Positive COVID-19 letter will be sent via [employer] or the Mail.  (Exception, no letters sent to Presurgerical/Preprocedure Patients)    [Name]  Layne/Sharmin Victor

## 2021-06-13 NOTE — PATIENT INSTRUCTIONS - HE
Dermatology and ophthalmology scheduled.    For the skin lesions that we have could be a dyshidrotic eczema.  Use lotion once or twice a day and 1% hydrocortisone, max of 3 times a day for 2 weeks.  If symptoms are persisting let us know I will send a triamcinolone 0.5%.  This is safe on the body but not the face.    If rash is getting worse than you would want to see a dermatologist sooner, otherwise you can talk to them about this at your visit in January.    Discussed risks and benefits of hormone replacement and you are low risk and safe to stay on for a while.  If you are wishing to go off start taking a tab every other day.  We would want you on this at a max of 5 years.    If wishing to restart phentermine, would hold off for at least a couple of months with your recent Covid, we could do a video visit to restart and then an office visit 3 months later if wishing to continue.      Health Maintenance   Topic Date Due     HEPATITIS C SCREENING  NA     HIV SCREENING  NA     ZOSTER VACCINES (1 of 2) If you are interested in the new shingles shot, Shingrix, please check with insurance for coverage either in clinic or at a pharmacy. It is a 2 shot series 2-6 months apart.      PREVENTIVE CARE VISIT  Today     MAMMOGRAM  05/15/2021     COLORECTAL CANCER SCREENING  10/23/2022     PAP SMEAR  Today     HPV TEST  Today     LIPID  Today     ADVANCE CARE PLANNING  Packet given     TD 18+ HE  07/25/2028     INFLUENZA VACCINE RULE BASED  Completed     TDAP ADULT ONE TIME DOSE  Completed     Pneumococcal Vaccine: Pediatrics (0 to 5 Years) and At-Risk Patients (6 to 64 Years)  NA     HEPATITIS B VACCINES  Completed     Dexa scan-ordered

## 2021-06-13 NOTE — PROGRESS NOTES
Assessment/Plan:  1. Routine general medical examination at a health care facility  Gynecologic Cytology (PAP Smear)   2. Hypertension  Comprehensive Metabolic Panel    HM2(CBC w/o Differential)   3. Hypercholesteremia  Lipid Cascade   4. Basal cell carcinoma (BCC), unspecified site     5. Benign neoplasm of colon, unspecified part of colon     6. Vitamin D deficiency  Vitamin D, Total (25-Hydroxy)   7. Vitamin B12 deficiency (non anemic)  Vitamin B12   8. Menopause  DXA Bone Density Scan   9. Rash       Dermatology and ophthalmology scheduled.    For the skin lesions that we have could be a dyshidrotic eczema.  Use lotion once or twice a day and 1% hydrocortisone, max of 3 times a day for 2 weeks.  If symptoms are persisting let us know I will send a triamcinolone 0.5%.  This is safe on the body but not the face.    If rash is getting worse than you would want to see a dermatologist sooner, otherwise you can talk to them about this at your visit in January.    Discussed risks and benefits of hormone replacement and you are low risk and safe to stay on for a while.  If you are wishing to go off start taking a tab every other day.  We would want you on this at a max of 5 years.    If wishing to restart phentermine, would hold off for at least a couple of months with your recent Covid, we could do a video visit to restart and then an office visit 3 months later if wishing to continue.    Patient is a 60 y.o. female here for physical exam. See health maintenance section below. Labs as ordered.        HPI    Chief Complaint   Patient presents with     Annual Exam     pt is fasting     HTN: Usually at work 120/ 70's    COVID:  Diagnosed October 26.  She feels she is improved and is back to work.    Skin spots:  Re d spot on back and itchy.  Noticed for a couple of week, then last year in the fall.  wtaer filled blister that pops and then get scabby.  Bilateral and not painful.  No pus or red.  Had eczema as a child.   Heat makes it more itchy.      HRT:  Felt very helpful.  No personal or family history of blood clots.  Grandmother did have breast cancer at age 90.  Been on since December 2019.  She wonders how to go off of this if she wishes to do so.    Health Maintenance   Topic Date Due     HEPATITIS C SCREENING  NA     HIV SCREENING  NA     ZOSTER VACCINES (1 of 2) If you are interested in the new shingles shot, Shingrix, please check with insurance for coverage either in clinic or at a pharmacy. It is a 2 shot series 2-6 months apart.      PREVENTIVE CARE VISIT  Today     MAMMOGRAM  05/15/2021     COLORECTAL CANCER SCREENING  10/23/2022     PAP SMEAR  Today     HPV TEST  Today     LIPID  Today     ADVANCE CARE PLANNING  Packet given     TD 18+ HE  07/25/2028     INFLUENZA VACCINE RULE BASED  Completed     TDAP ADULT ONE TIME DOSE  Completed     Pneumococcal Vaccine: Pediatrics (0 to 5 Years) and At-Risk Patients (6 to 64 Years)  NA     HEPATITIS B VACCINES  Completed     Dexa scan-ordered     Patient Active Problem List   Diagnosis     Allergic Rhinitis     Oral Allergy Syndrome     Basal Cell Carcinoma Of The Skin     Adiposity     Hypertension     Female Stress Incontinence     Benign Polyps Of The Large Intestine     Mammogram - Abnormal     BMI 30.0-30.9,adult     Prediabetes     Hypercholesteremia     Family history of osteoporosis     Vitamin D deficiency     Decreased sense of smell     Current Outpatient Medications   Medication Sig     cholecalciferol, vitamin D3, (VITAMIN D3) 2,000 unit cap Take 2,000 Units by mouth daily.     citalopram (CELEXA) 20 MG tablet TAKE ONE TABLET BY MOUTH ONCE DAILY     estrogen, conjugated,-medroxyPROGESTERone (PREMPRO) 0.3-1.5 mg per tablet Take 1 tablet by mouth daily.     lisinopriL (PRINIVIL,ZESTRIL) 5 MG tablet Take 1 tablet (5 mg total) by mouth daily.     melatonin 5 mg Tab tablet Take 5 mg by mouth at bedtime as needed.     b complex vitamins tablet Take 1 tablet by mouth  "daily.       Patient is a 60 y.o. female presents for a physical exam.    The following portions of the patient's history were reviewed and updated as appropriate: past medical history, past social history, past surgical history and problem list.    Review of Systems  Pertinent items are noted in HPI.  Immunization History   Administered Date(s) Administered     INFLUENZA,RECOMBINANT,INJ,PF QUADRIVALENT 18+YRS 10/02/2019     Influenza, inj, historic,unspecified 09/17/2009, 10/06/2020     Td,adult,historic,unspecified 09/25/1997     Tdap 10/08/2007, 07/25/2018     Recent Results (from the past 240 hour(s))   HM2(CBC w/o Differential)   Result Value Ref Range    WBC 7.2 4.0 - 11.0 thou/uL    RBC 4.56 3.80 - 5.40 mill/uL    Hemoglobin 13.5 12.0 - 16.0 g/dL    Hematocrit 40.0 35.0 - 47.0 %    MCV 88 80 - 100 fL    MCH 29.5 27.0 - 34.0 pg    MCHC 33.7 32.0 - 36.0 g/dL    RDW 11.0 11.0 - 14.5 %    Platelets 448 (H) 140 - 440 thou/uL    MPV 6.5 (L) 7.0 - 10.0 fL     I have had an Advance Directives discussion with the patient.  Objective:    /78 (Patient Site: Left Arm, Patient Position: Sitting, Cuff Size: Adult Regular)   Pulse 74   Temp 97.4  F (36.3  C) (Oral)   Resp 16   Ht 5' 4.5\" (1.638 m)   Wt 158 lb 4 oz (71.8 kg)   LMP 03/29/2018   BMI 26.74 kg/m        General Appearance:    Alert, cooperative, no distress, appears stated age   Head:    Normocephalic, without obvious abnormality, atraumatic   Eyes:    PERRL, conjunctiva/corneas clear, EOM's intact, fundi     benign, both eyes   Ears:    Normal TM's and external ear canals, both ears   Nose:   Nares normal, septum midline, mucosa normal, no drainage    or sinus tenderness   Throat:   Lips, mucosa, and tongue normal; teeth and gums normal   Neck:   Supple, symmetrical, trachea midline, no adenopathy;     thyroid:  no enlargement/tenderness/nodules; no carotid    bruit or JVD   Back:     Symmetric, no curvature, ROM normal, no CVA tenderness   Lungs:   "   Clear to auscultation bilaterally, respirations unlabored   Chest Wall:    No tenderness or deformity    Heart:    Regular rate and rhythm, S1 and S2 normal, no murmur, rub   or gallop   Breast Exam:    No tenderness, masses, or nipple abnormality   Abdomen:     Soft, non-tender, bowel sounds active all four quadrants,     no masses, no organomegaly   Genitalia:    Normal female without lesion, discharge or tenderness       Extremities:   Extremities normal, atraumatic, no cyanosis or edema   Pulses:   2+ and symmetric all extremities   Skin:   Skin color, texture, turgor normal, excoriated pink papules on upper back   Lymph nodes:   Cervical, supraclavicular, and axillary nodes normal   Neurologic:   CNII-XII intact, normal strength, sensation and reflexes     throughout

## 2021-06-13 NOTE — TELEPHONE ENCOUNTER
Refill Approved    Rx renewed per Medication Renewal Policy. Medication was last renewed on 3/8/20.    Terri Vu, [facility] Triage/Med Refill 11/23/2020     Requested Prescriptions   Pending Prescriptions Disp Refills     lisinopriL (PRINIVIL,ZESTRIL) 5 MG tablet [Pharmacy Med Name: LISINOPRIL 5MG TABS] 90 tablet 2     Sig: TAKE ONE TABLET BY MOUTH ONCE DAILY       Ace Inhibitors Refill Protocol Passed - 11/22/2020  5:04 AM        Passed - PCP or prescribing provider visit in past 12 months       Last office visit with prescriber/PCP: 7/29/2020 Jami Mcgarry MD OR same dept: 7/29/2020 Jami Mcgarry MD OR same specialty: 7/29/2020 Jami Mcgarry MD  Last physical: 11/11/2020 Last MTM visit: Visit date not found   Next visit within 3 mo: Visit date not found  Next physical within 3 mo: Visit date not found  Prescriber OR PCP: Jami Mcgarry MD  Last diagnosis associated with med order: 1. Hypertension  - lisinopriL (PRINIVIL,ZESTRIL) 5 MG tablet [Pharmacy Med Name: LISINOPRIL 5MG TABS]; TAKE ONE TABLET BY MOUTH ONCE DAILY  Dispense: 90 tablet; Refill: 2    If protocol passes may refill for 12 months if within 3 months of last provider visit (or a total of 15 months).             Passed - Serum Potassium in past 12 months     Lab Results   Component Value Date    Potassium 4.5 11/11/2020             Passed - Blood pressure filed in past 12 months     BP Readings from Last 1 Encounters:   11/11/20 112/78             Passed - Serum Creatinine in past 12 months     Creatinine   Date Value Ref Range Status   11/11/2020 0.71 0.60 - 1.10 mg/dL Final

## 2021-06-15 PROBLEM — E78.00 HYPERCHOLESTEREMIA: Status: ACTIVE | Noted: 2017-02-15

## 2021-06-16 PROBLEM — R43.8 DECREASED SENSE OF SMELL: Status: ACTIVE | Noted: 2019-10-02

## 2021-06-16 PROBLEM — Z82.62 FAMILY HISTORY OF OSTEOPOROSIS: Status: ACTIVE | Noted: 2018-07-25

## 2021-06-16 PROBLEM — E55.9 VITAMIN D DEFICIENCY: Status: ACTIVE | Noted: 2018-07-25

## 2021-06-16 NOTE — TELEPHONE ENCOUNTER
Refill Approved    Rx renewed per Medication Renewal Policy. Medication was last renewed on 8/11/20.    Sidney Carr, [facility] Triage/Med Refill 4/21/2021     Requested Prescriptions   Pending Prescriptions Disp Refills     PREMPRO 0.3-1.5 mg per tablet [Pharmacy Med Name: PREMPRO 0.3-1.5MG TABS] 84 tablet 2     Sig: TAKE ONE TABLET BY MOUTH ONCE DAILY       Hormone Replacement Therapy Refill Protocol Passed - 4/20/2021  6:44 PM        Passed - PCP or prescribing provider visit in past 12 months       Last office visit with prescriber/PCP: 7/29/2020 Jami Mcgarry MD OR same dept: 7/29/2020 Jami Mcgarry MD OR same specialty: 7/29/2020 Jami Mcgarry MD  Last physical: 11/11/2020 Last MTM visit: Visit date not found     Next visit within 3 mo: Visit date not found  Next physical within 3 mo: Visit date not found  Prescriber OR PCP: Jami Mcgarry MD  Last diagnosis associated with med order: 1. Hormone replacement therapy (HRT)  - PREMPRO 0.3-1.5 mg per tablet [Pharmacy Med Name: PREMPRO 0.3-1.5MG TABS]; TAKE ONE TABLET BY MOUTH ONCE DAILY  Dispense: 84 tablet; Refill: 2     If protocol passes may refill for 3 months.

## 2021-06-17 NOTE — PATIENT INSTRUCTIONS - HE
"Patient Instructions by Bryce Umaña PT at 12/11/2019  9:30 AM     Author: Bryce Umaña PT Service: -- Author Type: Physical Therapist    Filed: 12/11/2019 10:18 AM Encounter Date: 12/11/2019 Status: Signed    : Bryce Umaña PT (Physical Therapist)        DEAD BUG    While lying on your back with your knees bent, slowly raise up one foot and opposite arm.    Retrun to starting position and then repeat on the opposite side.     Keep your low back flat on the floor the entire time.    Hold for 30 seconds.  1x/day      BRIDGING    While lying on your back, tighten your lower abdominals, squeeze your buttocks and then raise your buttocks off the floor/bed as creating a \"Bridge\" with your body.    10-15 repetitions  1x/day          "

## 2021-06-17 NOTE — PROGRESS NOTES
Assessment: Medication check for Phentermine  1. High risk medication use       Plan: The following high BMI interventions were performed this visit: encouragement to exercise and weight monitoring  Stop med and radha SAHNI if any chest pain, palpations or shortness of breath. Follow-up in 1 month. This is month # 1. Phentermine dose is 30 mg.    Patient Instructions   We will monitor blood pressure.     This is month #1 on phentermine 30 mg. Take in the morning.    Follow up in 2 months for medication check.     Set up physical in 4 months. Come fasting. You can combine with medication check.         Chief Complaint   Patient presents with     Weight Loss     pt would like to discuss taking phentermine       Subjective: Ursula Pedro comes in today for a medication check for Phentermine. No chest pain, palpations or shortness of breath. They have been on the med for  0 months. They have lost 0 pounds in the last month and  28 pounds overall. They exercise once-twice a week for an hour at a time. Since losing a co-worker, she was working 10-12 hours a day, but is now getting a new co-worker and will be returning normal exercise habits. They are journaling 1200 calories a day with the [employer] amrita. She has been on phentermine in the past and experienced a couple palpations while on medication. She drinks green tea, black decaffeinated tea, and a diet coke 3 times a week. She has been on the medication twice for 6 months at a time in the past.     Hypertension: Blood pressure in clinic was 132/88. She discontinued lisinopril 5 mg after weight loss.     ROS: TSH on 11/20/2015 was 1.56. All other systems reviewed.    Objective: /88 (Patient Site: Left Arm, Patient Position: Sitting, Cuff Size: Adult Regular)  Pulse 68  Temp 98.2  F (36.8  C) (Oral)   Resp 16  Wt 165 lb 7 oz (75 kg)  BMI 27.53 kg/m2  General: No apparent distress  CV: Regular rate and rhythm without murmur  Lungs: Clear to auscultation  bilaterally  Psych: Normal mood and affect.    PFSH:   History   Smoking Status     Former Smoker   Smokeless Tobacco     Never Used     Family: Her brother  recently.           ADDITIONAL HISTORY SUMMARIZED (2): None.  DECISION TO OBTAIN EXTRA INFORMATION (1): None.   RADIOLOGY TESTS (1): None.  LABS (1): Reviewed TSH on 2015  MEDICINE TESTS (1): None.  INDEPENDENT REVIEW (2 each): None.     The visit lasted a total of 15 minutes face to face with the patient. Over 50% of the time was spent counseling and educating the patient about phentermine check.    I, Marcy Jordan, am scribing for and in the presence of, Dr. Jami Mcgarry.    I, Dr. Jami Mcgarry MD, personally performed the services described in this documentation, as scribed by Marcy Jordan in my presence, and it is both accurate and complete.    Data Points: 1

## 2021-06-17 NOTE — PATIENT INSTRUCTIONS - HE
Patient Instructions by Bryce Umaña PT at 12/18/2019  9:30 AM     Author: Bryce Umaña PT Service: -- Author Type: Physical Therapist    Filed: 12/18/2019  9:55 AM Encounter Date: 12/18/2019 Status: Signed    : Bryce Umaña PT (Physical Therapist)        Table-Top with Marching    Begin in the table-top position with lower abdominal muscles engaged, low back flat against the ground (90 degrees at your hips and knees). Then alternate tapping your heels to the ground one at a time. Between each tap, return to the starting position before alternating to the opposite leg. Tapping the ground with each heel once is one repetition.    10-15 repetitions  2x/day

## 2021-06-18 NOTE — PATIENT INSTRUCTIONS - HE
Patient Instructions by Adonis Collins MD at 2/29/2020 12:30 PM     Author: Adonis Collins MD Service: -- Author Type: Physician    Filed: 2/29/2020  1:49 PM Encounter Date: 2/29/2020 Status: Addendum    : Adonis Collins MD (Physician)    Related Notes: Original Note by Adonis Collins MD (Physician) filed at 2/29/2020  1:48 PM       -Rapid strep test is negative.  A confirmatory strep test is in process and will be finalized tomorrow.  -We will only reach out to you if the confirmatory strep test is positive.  -Recommend rest, hydration, and use of over the counter acetaminophen as needed to manage discomfort.  -Take the full courses of both prednisone and Augmentin to treat presumed bacterial uvulitis.  -Please proceed to the ER if your swelling worsens significantly or you develop new concerning symptoms.  Patient Education     Uvulitis    The uvula is the tissue that hangs in the back of the throat. Uvulitis is inflammation of the uvula. Inflammation happens when the body responds to an injury, allergic reaction, infection or illness. Symptoms of inflammation may include redness, irritation, itching, swelling, or burning. Uvulitis is more common in children than adults.  Symptoms of uvulitis include:    Sore throat    Trouble swallowing    Painful swallowing    Trouble breathing  Possible causes of uvulitis include:    Throat infection    Inhaling or swallowing chemicals     Inhaling hot air or steam    Allergic reaction to something eaten, touched or breathed in  In rare cases, uvulitis can be caused by a condition called angioedema. Angioendema can be:    Hereditary (runs in the family).     A side effect of a class of drugs used for high blood pressure called ACE inhibitors.    Life-threatening. It can lead to swelling of the air passage in the mouth or throat. Severe swelling can block your breathing and cause death. Watch for the earliest signs of this illness. Call 911 if the  swelling involves the face, mouth, or throat areas.  To help find the cause of the uvulitis, imaging tests may be done. If infection is suspected, swabs from the throat or samples of blood may be tested. Questions may be asked about an individuals vaccination history to be sure it is up to date. A cause for uvulitis is not always found.  Treatment depends on the cause and the severity of the symptoms.  Home care  Medicines  The doctor may prescribe antibiotics for an infection. For an allergic reaction or angioedema, medicines called steroids or antihistamines may be given. Follow instructions when using any medicine.  General care  To care for the condition at home:    Rest until the symptoms go away.    If medicines were prescribed, be sure they are taken as directed. They should be taken until they are gone or the healthcare provider says to stop them.    If you were told that your angioedema was from a medicine that you are taking, you must stop taking this medicine. Contact your doctor for a prescription for a different medicine. Advise future medical providers that you are allergic to this medicine.    Contact your healthcare provider before taking any over-the-counter medicines.    Drink fluids. Pain when swallowing may make it harder to drink and lead to dehydration. To prevent this, sip fluids throughout the day. Children can be given frozen juice bars, milk, or other cold liquids. Watch for the signs of dehydration listed below.    Ask your healthcare provider when you can return to work or school. Ask your [place] healthcare provider when your child can return to school or .  Follow-up care  Follow up with the healthcare provider, or as directed. You may be referred to an allergy doctor or an ear, nose, or throat doctor for further evaluation and treatment. Make these visits as soon as possible.  When to seek medical advice  Call the healthcare provider right away for any of the  following:    Symptoms not going away or getting worse    Symptoms of dehydration, including dark urine, dry mouth, cracked lips, dizziness, or sunken eyes    A child acting very sick or not improving  Fever in adults:     Fever over 100.4 F (38 C), or as directed by your healthcare provider  Fever in children:    Child of any age has repeated fevers above 104 F (40 C).    Child younger than 2 years of age has a fever of 100.4 F (38 C) that continues for more than 1 day.    Child 2 years old or older has a fever of 100.4 F (38 C) that continues for more than 3 days.  Call 911  Call 911 if any of these occur:    Drooling or trouble swallowing    Trouble breathing    Trouble talking    Swollen tongue, lips, face, or throat. You can tell this if your child's voice changes.    Jerking and loss of consciousness; seizure    Lack of response, extreme drowsiness, or trouble waking up    Fainting    Confusion    Child being unresponsive    Skin or lips look blue, purple, or gray  Date Last Reviewed: 11/20/2015 2000-2017 [employer]. [address] [place] [address], [place] [address]. All rights reserved. This information is not intended as a substitute for professional medical care. Always follow your healthcare professional's instructions.

## 2021-06-18 NOTE — PROGRESS NOTES
Assessment: Medication check for Phentermine  1. High risk medication use         Plan: The following high BMI interventions were performed this visit: encouragement to exercise and weight monitoring  Stop med and notify MD if any chest pain, palpations or shortness of breath. Follow-up in 1 month. This is month # 3 . Phentermine dose is 30 mg.    Patient Instructions   You have lost 8 pounds.     This is month 3 on phentermine 30 mg.     Physical on . Please come fasting.     Set up medication check in 3 months.             Chief Complaint   Patient presents with     Medication Management      Subjective: Ursula Pedro comes in today for a medication check for Phentermine. No chest pain, palpations or shortness of breath. They have been on the med for 2 months. They have lost 8 pounds in the last 2 months and 8 pounds overall. They exercise 45-60 minutes 4 days a week. They are journaling 6770-8216 calories. She has missed a couple days of recording calories. She denies concerns of hunger. She had a shower yesterday and ate and drank a lot last night.     Perimenopause: She started citalopram 30 mg for hot flashes, however, she has had frequent hot flashes in the past 2 months. She is wondering if phentermine can cause an increase in hot flashes. LMP was in March, but she thinks it was caused by stress since her brother .     Hypertension: Blood pressure in clinic was 128/82.       PFSH:   Social: She is a Medical Assistant at [employer]. She is going on a cruise .     Objective: /82 (Patient Site: Left Arm, Patient Position: Sitting, Cuff Size: Adult Regular)  Pulse 80  Resp 14  Wt 157 lb 9 oz (71.5 kg)  BMI 26.22 kg/m2  General: No apparent distress  CV: Regular rate and rhythm without murmur  Lungs: Clear to auscultation bilaterally    ADDITIONAL HISTORY SUMMARIZED (2): None.  DECISION TO OBTAIN EXTRA INFORMATION (1): None.   RADIOLOGY TESTS (1): None.  LABS (1): None.  MEDICINE  TESTS (1): None.  INDEPENDENT REVIEW (2 each): None.     The visit lasted a total of 11 minutes face to face with the patient. Over 50% of the time was spent counseling and educating the patient about weight loss.    I, Marcy Jordan, am scribing for and in the presence of, Dr. Jami Mcgarry.    I, Dr. Jami Mcgarry MD, personally performed the services described in this documentation, as scribed by Marcy Jordan in my presence, and it is both accurate and complete.    Data Points: 0

## 2021-06-19 NOTE — PROGRESS NOTES
Assessment/Plan:  1. Routine general medical examination at a health care facility     2. Screening for malignant neoplasm of cervix  Gynecologic Cytology (PAP Smear)   3. Menopause  DXA Bone Density Scan    Follicle Stimulating Hormone (FSH)   4. Dysfunctional uterine bleeding  Thyroid Cascade    Prolactin    Follicle Stimulating Hormone (FSH)    US Pelvis With Transvaginal Non OB    HM2(CBC w/o Differential)    US Pelvis With Transvaginal Non OB   5. Family history of osteoporosis     6. Vitamin D deficiency  Vitamin D, Total (25-Hydroxy)   7. Hypertension  Comprehensive Metabolic Panel   8. Basal cell carcinoma of skin  Ambulatory referral to Dermatology   9. Hypercholesteremia  Lipid Cascade   10. Prediabetes     11. High risk medication use     12. Female stress incontinence         Patient is a 58 y.o. female here for physical exam. See health maintenance section below. Labs as ordered.      Fasting lab at work when able.     Pelvic US ordered at [facility].     Referral to Dermatology submitted.     Check blood pressure at work in 2 weeks and send [employer] message.     Follow up in 2 months for phentermine check.     Starting with Kegels.     Follow with urology if needed.     Breast exams: Once per month on your own, once per year by a doctor, and a mammogram once per year.     Started Lisinopril 5 mg daily.    HPI    Chief Complaint   Patient presents with     Annual Exam     physical w/ pap, pt is not fasting       Health Maintenance   Topic Date Due     TD 18+ HE  Given today     INFLUENZA VACCINE RULE BASED (1) 08/01/2018     PAP SMEAR  Today     MAMMOGRAM  01/10/2019     ADVANCE DIRECTIVES DISCUSSED WITH PATIENT  Packet today     COLONOSCOPY  10/23/2022, as long as no blood in stool, no change in bowel habits, no abdominal pain     TDAP ADULT ONE TIME DOSE  Completed     Dexa scan-ordered     Patient Active Problem List   Diagnosis     Allergic Rhinitis     Oral Allergy Syndrome     Basal Cell  Carcinoma Of The Skin     Adiposity     Hypertension     Female Stress Incontinence     Benign Polyps Of The Large Intestine     Mammogram - Abnormal     BMI 30.0-30.9,adult     Prediabetes     Hypercholesteremia     High risk medication use     Family history of osteoporosis     Vitamin D deficiency     Current Outpatient Prescriptions   Medication Sig     cholecalciferol, vitamin D3, (VITAMIN D3) 2,000 unit cap Take 2,000 Units by mouth daily.     citalopram (CELEXA) 20 MG tablet Take 1 tablet (20 mg total) by mouth daily.     phentermine 30 MG capsule TAKE 1 CAPSULE BY MOUTH ONCE DAILY IN THE MORNING (DUE  FOR  OFFICE  VISIT  FOR  FOLLOW  UP)     lisinopril (PRINIVIL,ZESTRIL) 5 MG tablet Take 1 tablet (5 mg total) by mouth daily.       Patient is a 58 y.o. female presents for a physical exam.    Overweight: No chest pain, palpations or shortness of breath. They have been on the med for 3 months. They have lost 0 pounds in the last 3 months and 8 pounds overall. They exercise 60 minutes 4 days a week. They are journaling 4590-2170 calories.She went on a cruise last week and was eating a lot. She is down 13 pounds and lost 0 pounds since June. She works out 3 times a week for at least an hour.     ASCUS Cells: Pap on 8/9/2013 was normal. PAP in 2012 showed ASCUS with negative HPV. PAP in 2010 showed ASCUS with negative HPV. She denies concerns of a sexually transmitted disease.     Dysfunctional Uterine Bleeding: Her periods have been getting heavier and clotty in the beginning of 2017. She stopped having a period in October 2017 and then had week long periods in March and April of 2018. She has not had a period since April 2018. She denies a history pelvic US. She is amenable to US.     Hypertension: Home blood pressure readings range from 123-152 over 73-88. Blood pressure in clinic was 138/92. She is amenable to starting Lisinopril 5 mg daily. She has taken lisinopril in the past and denies adverse side effects.  "    Stress And Urge Incontinence: She followed with physical therapy and started completing Kegel exercises. She drinks green tea daily and water daily. She denies leakage, however, when she has an urge she needs to run to the bathroom.     Health Maintenance: Mammogram on 1/10/2018 was normal. She is amenable to receiving advanced directives packet. Colonoscopy on 10/23/2017 showed colorectal polyps and diverticulosis. She is to follow in 5 years. She is amenable to DXA.     The following portions of the patient's history were reviewed and updated as appropriate: allergies, current medications, past family history, past medical history, past social history, past surgical history and problem list.    PFSH:   Social: She works at [employer]. She went on an [employer].     Review of Systems  Pertinent items are noted in HPI.  A 12 point comprehensive review of systems was negative except as noted.  Immunization History   Administered Date(s) Administered     Influenza, inj, historic,unspecified 09/17/2009     Td,adult,historic,unspecified 09/25/1997     Tdap 10/08/2007, 07/25/2018     No results found for this or any previous visit (from the past 240 hour(s)).  I have had an Advance Directives discussion with the patient.  The following are part of a depression follow up plan for the patient:  patient follow-up to return when and if necessary  The following high BMI interventions were performed this visit: encouragement to exercise and weight monitoring  Objective:    BP (!) 138/92 (Patient Site: Right Arm, Patient Position: Sitting, Cuff Size: Adult Regular)  Pulse 88  Temp 98.3  F (36.8  C) (Oral)   Resp 16  Ht 5' 5\" (1.651 m)  Wt 157 lb 5 oz (71.4 kg)  BMI 26.18 kg/m2      General Appearance:    Alert, cooperative, no distress, appears stated age   Head:    Normocephalic, without obvious abnormality, atraumatic   Eyes:    PERRL, conjunctiva/corneas clear, EOM's intact, fundi     benign, both eyes "   Ears:    Normal TM's and external ear canals, both ears   Nose:   Nares normal, septum midline, mucosa normal, no drainage    or sinus tenderness   Throat:   Lips, mucosa, and tongue normal; teeth and gums normal   Neck:   Supple, symmetrical, trachea midline, no adenopathy;     thyroid:  no enlargement/tenderness/nodules; no carotid    bruit or JVD   Back:     Symmetric, no curvature, ROM normal, no CVA tenderness   Lungs:     Clear to auscultation bilaterally, respirations unlabored   Chest Wall:    No tenderness or deformity    Heart:    Regular rate and rhythm, S1 and S2 normal, no murmur, rub   or gallop   Breast Exam:    No tenderness, masses, or nipple abnormality   Abdomen:     Soft, non-tender, bowel sounds active all four quadrants,     no masses, no organomegaly   Genitalia:    Normal female without lesion, discharge or tenderness       Extremities:   Extremities normal, atraumatic, no cyanosis or edema   Pulses:   2+ and symmetric all extremities   Skin:   Skin color, texture, turgor normal, no rashes or lesions   Lymph nodes:   Cervical, supraclavicular, and axillary nodes normal   Neurologic:   CNII-XII intact, normal strength, sensation and reflexes     throughout        ADDITIONAL HISTORY SUMMARIZED (2): None.  DECISION TO OBTAIN EXTRA INFORMATION (1): None.   RADIOLOGY TESTS (1): Pelvic US ordered.   LABS (1): CDF, VTD, CMP, FSH, PCN, TSH, Lip, and PAP ordered.   MEDICINE TESTS (1): None.  INDEPENDENT REVIEW (2 each): None.     The visit lasted a total of 35 minutes face to face with the patient. Over 50% of the time was spent counseling and educating the patient about preventive health and dysfunctional uterine bleeding.    I, Marcy Jordan, am scribing for and in the presence of, Dr. Jami Mcgarry.    I, Dr. Jami Mcgarry MD, personally performed the services described in this documentation, as scribed by Marcy Jordan in my presence, and it is both accurate and complete.    Data  Points: 2

## 2021-06-20 NOTE — PROGRESS NOTES
[facility] Discharge Summary  Patient Name: Ursula Pedro  Date: 10/31/2018  Referral Diagnosis: shoulder  Referring provider: Cristel Aly PA-C  Visit Diagnosis:   1. Chronic pain of both shoulders     2. Poor posture         Goals:  Pt. will be independent with home exercise program in : 4 weeks  Pt. will improve posture : and demonstrate posture with minimal to no cuing;in sitting;in standing;for working;in 6 weeks  Patient will reach / maintain arm movement: forward;overhead;behind;for home chores;for dressing;with no pain;with less difficulty;in 6 weeks  Patient will decrease : SPADI score;for improved quality of function;for improved quality of life;in 6 weeks  Pt will: be able to work out with no increase of symptoms of R shoulder by 5 weeks.    Patient was seen for 3 visits for physical therapy of shoulder pain from 8/21/18 to 10/2/18 with no follow up appointments.   The patient attended therapy initially, but did not finish the therapy sessions prescribed.  Goals were not fully achieved. Explanation for goals not achieved: Patient did not return to measure goals.  The patient discontinued therapy, did not return.  No further therapy is required at this time.    Therapy will be discontinued at this time.  The patient will need a new referral to resume physical therapy treatment. Please see below for patient's current status.    Thank you for your referral.  Cynthia Godinez, PT, DPT  10/31/2018   2:41 PM      [facility] Daily Progress     Patient Name: Ursula Pedro  Date: 10/2/2018  Visit #: 3/12  Referral Diagnosis: shoulder  Referring provider: Cristel Aly PA-C  Visit Diagnosis:     ICD-10-CM    1. Chronic pain of both shoulders M25.511     G89.29     M25.512    2. Poor posture R29.3        Assessment:     Patient is a 58 y.o. female that presents with signs and symptoms consistent with R>L shoulder pain secondary to possible biceps tendonitis and bursitis per  imaging; however patient presented with full bilateral shoulder AROM and 5/5 MMT with no + special tests - patient would like to work on strengthening s/p shoulder injection. Patient demonstrates impairments including decreased flexibility of cervicothoracic musculature with bilateral shoulder joint instability and decreased joint mobility leading to impaired functional mobility. Patient's functional limitations include performing workouts/yoga with no increase of pain, carrying groceries/laundry with no difficulty, and reaching behind her back with weights.    Today patient hasn't been seen since , reporting shoulders are getting better, with 0/10 pain but now her neck is feeling about 3/10 bilaterally and right in the middle.     HEP/POC compliance is  good .  Patient demonstrates understanding/independence with home program.  Patient is appropriate to continue with skilled physical therapy intervention, as indicated by initial plan of care.    Goal Status:  Pt. will be independent with home exercise program in : 4 weeks  Pt. will improve posture : and demonstrate posture with minimal to no cuing;in sitting;in standing;for working;in 6 weeks  Patient will reach / maintain arm movement: forward;overhead;behind;for home chores;for dressing;with no pain;with less difficulty;in 6 weeks  Patient will decrease : SPADI score;for improved quality of function;for improved quality of life;in 6 weeks  Pt will: be able to work out with no increase of symptoms of R shoulder by 5 weeks.      Plan / Patient Education:     Continue with initial plan of care.  Progress with home program as tolerated.    Plan for next visit: shoulder isometrics, lat pull downs, scapular strengthening, scapular protraction    Subjective:     Pain Ratin   Shoulders are feeling better but having some middle of the neck pain. She was sore after last visit but she did work out that night as well. Her yoga exercises do help with her neck, no  headaches.     Objective:       Treatment Today       Patient Education: Patient was educated on continuing plan of care, progress and review of current HEP. Patient educated on importance of consistency with exercise and therapy, as well as activity modification in order to see change and improvements. Patient demonstrated and verbalized understanding.     Exercises:  Exercise #1: sidelying IR stretch - 10 reps, then 10 sec hold x 3  Comment #1: seated UT and levator stretch - hold 30 sec x 2  Exercise #2: ER doorway stretch - hold 10 sec x 4  Comment #2: IR towel stretch - hold 10 sec x 3  Exercise #3: sidelying ER - 10-30 reps 2 lbs  Comment #3: theraband row/extension/PNF/horizontal abd/IR/ER - 10-30 reps, orange band - give green at next visit  Exercise #4: prone ITYs - 10 reps 2 lbs  Comment #4: pec doorway stretch - hold 30 sec x 2  Exercise #5: cervical isometrics - hold 5 sec x 10 each way  Comment #5: supine chin tuck - hold 5 sec x 10      TREATMENT MINUTES COMMENTS   Evaluation     Self-care/ Home management     Manual therapy 18 STM to cervical paraspinals, levators and UT  Manual SB stretch bilaterally  Cervical upglides to increase motion   Neuromuscular Re-education     Therapeutic Activity     Therapeutic Exercises 8 See above flowsheet  Arm bike warm up     Gait training     Modality__________________                Total 26    Blank areas are intentional and mean the treatment did not include these items.       Cynthia Godinez, PT  10/2/2018

## 2021-06-20 NOTE — PROGRESS NOTES
[facility] Daily Progress     Patient Name: Ursula Pedro  Date: 8/27/2018  Visit #: 2/12  Referral Diagnosis: shoulder  Referring provider: Cristel Aly PA-C  Visit Diagnosis:     ICD-10-CM    1. Chronic pain of both shoulders M25.511     G89.29     M25.512    2. Poor posture R29.3        Assessment:     Patient is a 58 y.o. female that presents with signs and symptoms consistent with R>L shoulder pain secondary to possible biceps tendonitis and bursitis per imaging; however patient presented with full bilateral shoulder AROM and 5/5 MMT with no + special tests - patient would like to work on strengthening s/p shoulder injection. Patient demonstrates impairments including decreased flexibility of cervicothoracic musculature with bilateral shoulder joint instability and decreased joint mobility leading to impaired functional mobility. Patient's functional limitations include performing workouts/yoga with no increase of pain, carrying groceries/laundry with no difficulty, and reaching behind her back with weights.    Today patient performed all scapular strengthening to add to HEP.    HEP/POC compliance is  good .  Patient demonstrates understanding/independence with home program.  Patient is appropriate to continue with skilled physical therapy intervention, as indicated by initial plan of care.    Goal Status:  Pt. will be independent with home exercise program in : 4 weeks  Pt. will improve posture : and demonstrate posture with minimal to no cuing;in sitting;in standing;for working;in 6 weeks  Patient will reach / maintain arm movement: forward;overhead;behind;for home chores;for dressing;with no pain;with less difficulty;in 6 weeks  Patient will decrease : SPADI score;for improved quality of function;for improved quality of life;in 6 weeks  Pt will: be able to work out with no increase of symptoms of R shoulder by 5 weeks.      Plan / Patient Education:     Continue with initial plan of  care.  Progress with home program as tolerated.    Plan for next visit: shoulder isometrics, lat pull downs, scapular strengthening, scapular protraction    Subjective:     Pain Ratin  Patient reports still feeling it more on R>L, feeling it a little bit with arm bike today. No difficulty with exercises, she hadn't done them the first two c      Objective:       Treatment Today       Patient Education: Patient was educated on continuing plan of care, progress and review of current HEP. Patient educated on importance of consistency with exercise and therapy, as well as activity modification in order to see change and improvements. Patient demonstrated and verbalized understanding.     Exercises:  Exercise #1: sidelying IR stretch - 10 reps, then 10 sec hold x 3  Comment #1: seated UT and levator stretch - hold 30 sec x 2  Exercise #2: ER doorway stretch - hold 10 sec x 4  Comment #2: IR towel stretch - hold 10 sec x 3  Exercise #3: sidelying ER - 10-30 reps 2 lbs  Comment #3: theraband row/extension/PNF/horizontal abd/IR/ER - 10-30 reps, orange band - give green at next visit  Exercise #4: prone ITYs - 10 reps 2 lbs      TREATMENT MINUTES COMMENTS   Evaluation     Self-care/ Home management     Manual therapy     Neuromuscular Re-education     Therapeutic Activity     Therapeutic Exercises 28 See above flowsheet  Arm bike warm up   Gait training     Modality__________________                Total 28    Blank areas are intentional and mean the treatment did not include these items.       Cynthia Godinez, PT  2018

## 2021-06-20 NOTE — LETTER
Letter by Jami Mcgarry MD at      Author: Jami Mcgarry MD Service: -- Author Type: --    Filed:  Encounter Date: 2/19/2020 Status: (Other)           February 19, 2020        Ursula Pedro  [address] [address]        Dear Ursula,    Breast cancer is the most common type of cancer among American women. The earlier breast cancer is detected, the better the survival rate. Your best defense against breast cancer is having a screening mammogram on a regular basis. The American Cancer Society recommends that women with an average risk of breast cancer should undergo regular screening mammography starting at age 45 years.         Women aged 45 to 54 years should have a mammogram annually.     Women 55 years and older should have a mammogram at least every other year.     There may be important reasons for you to follow a more frequent screening plan or an earlier start for breast cancer screening, so please discuss your health history and your family health history with your primary care provider.       We reviewed our records and we do not see that you had a mammogram within the past two years. If you have not had a mammogram in the past two years, we strongly encourage you to call for your appointment today. For your convenience, we have included a phone number below for you to schedule your mammogram at a nearby [facility] location.      If you have had a recent mammogram or have questions about why you are receiving this letter, please contact your primary care clinic at [phone] or send a [employer] message  ([email or web address]). Your clinic will answer any questions or help obtain recent mammogram reports for your chart at [facility] so we can keep record of your preventive care.       Sincerely,    Jami Mcgarry MD    and your primary care team at [facility]  Schedule your mammogram today at one of our 7 locations  Please  call  [phone]

## 2021-06-20 NOTE — PROGRESS NOTES
[facility]   Shoulder Initial Evaluation    Patient Name: Ursula Pedro  Date of evaluation: 8/21/2018  Referral Diagnosis: Shoulder pain  Referring provider: Cristel Aly PA-C  Visit Diagnosis:     ICD-10-CM    1. Chronic pain of both shoulders M25.511     G89.29     M25.512    2. Poor posture R29.3        Assessment:        Patient is a 58 y.o. female that presents with signs and symptoms consistent with R>L shoulder pain secondary to possible biceps tendonitis and bursitis per imaging; however patient presented with full bilateral shoulder AROM and 5/5 MMT with no + special tests - patient would like to work on strengthening s/p shoulder injection. Patient demonstrates impairments including decreased flexibility of cervicothoracic musculature with bilateral shoulder joint instability and decreased joint mobility leading to impaired functional mobility. Patient's functional limitations include performing workouts/yoga with no increase of pain, carrying groceries/laundry with no difficulty, and reaching behind her back with weights. Today patient responded well to patient education and therapeutic exercise .    Patient educated on and demonstrated understanding of nature of impairment, plan of care, patient role and HEP. Patient compliant with PT and prognosis is good. Patient would benefit from skilled PT to progress and improve above impairments.      The POC is dynamic and will be modified on an ongoing basis.  Patient will return to clinic if symptoms persist.  Barriers to achieving goals as noted in the assessment section may affect outcome.  Prognosis to achieve goals is  good   Pt. is appropriate for skilled PT intervention as outlined in the Plan of Care (POC).  Pt. is a good candidate for skilled PT services to improve pain levels and function.    Goals:  Pt. will be independent with home exercise program in : 4 weeks  Pt. will improve posture : and demonstrate posture with minimal to no  cuing;in sitting;in standing;for working;in 6 weeks  Patient will reach / maintain arm movement: forward;overhead;behind;for home chores;for dressing;with no pain;with less difficulty;in 6 weeks  Patient will decrease : SPADI score;for improved quality of function;for improved quality of life;in 6 weeks  Pt will: be able to work out with no increase of symptoms of R shoulder by 5 weeks.    Patient's expectations/goals are realistic.    Barriers to Learning or Achieving Goals:  No Barriers.       Plan / Patient Instructions:        Plan of Care:   Communication with: Referral Source  Patient Related Instruction: Nature of Condition;Treatment plan and rationale;Self Care instruction;Basis of treatment;Body mechanics;Posture;Next steps;Expected outcome  Times per Week: 1-2  Number of Weeks: 6-8  Number of Visits: 12  Therapeutic Exercise: ROM;Stretching;Strengthening  Neuromuscular Reeducation: kinesio tape;posture;core;TNE  Manual Therapy: soft tissue mobilization;myofascial release;joint mobilization;muscle energy  Modalities: TENS    POC and pathology of condition were reviewed with patient.  Pt. is in agreement with the Plan of Care  A Home Exercise Program (HEP) was initiated today.  Pt. was instructed in exercises by PT and patient was given a handout with detailed instructions.    Plan for next visit: scapular strengthening, postural review, theraband ex, shoulder isometrics, RTC strengthening - ITYs   .   Subjective:         History of Present Illness:    Ursula is a 58 y.o. female who presents to therapy today with complaints of R shoulder pain, with a little bit on her left. Date of onset is 1/2018. Onset was sudden and symptoms are intermittent and getting better. Patient reports she has gone to chiropractor for shoulder issues, as well as injection; she does feel that it is tendonitis and bursitis. She denies history of similar symptoms. She describes their previous level of function as not limited. Having  most difficulty with working out and exercise/yoga now.     Pain Ratin  Pain rating at best: 0  Pain rating at worst: 8  Pain description: sharp and shooting- prior to getting the shot, now it is more dull. Xray shows tendonitis and bursitis     Functional limitations are described as occurring with:   exercises, and sometimes carrying heavy objects    Patient reports benefit from:  anti-inflammatory, cold       Objective:      Note: Items left blank indicates the item was not performed or not indicated at the time of the evaluation.    Patient Outcome Measures :    No Data Recorded   Scores range from 0-100%, where a score of 0% represents minimal pain and maximal function. The minimal clincically important difference is a score reduction of 10%.    Shoulder Examination  1. Chronic pain of both shoulders     2. Poor posture       Involved side: Right and Bilateral  Posture Observation:      General sitting posture is  fair.  General standing posture is fair.  Cervical Clearing: Normal    Joint Assessment:  Sternoclavicular Joint Assessment: Not Indicated  Acromioclavicular Joint Assessment: Not Indicated  Scapulothoracic Joint Assessment: WNL.  Glenohumeral Joint Assessment:Hypomobile.    Flexibility/Tissue Extensibility: TTP and decreased R>L UT and levator, biceps tendon      Shoulder/Elbow ROM  WFL and no pain  Date: 2018     Shoulder and Elbow ROM ( )   AROM in degrees AROM in degrees AROM in degrees    Right Left Right Left Right Left   Shoulder Flexion (0-180 )         Shoulder Abduction (0-180 )         Shoulder Extension (0-60 )         Shoulder ER (0-90 )         Shoulder IR (0-70 )         Shoulder IR/Ext         Elbow Flexion (150 )         Elbow Extension (0 )          PROM in degrees PROM in degrees PROM in degrees    Right Left Right Left Right Left   Shoulder Flexion (0-180 )         Shoulder Abduction (0-180 )         Shoulder Extension (0-60 )         Shoulder ER (0-90 )         Shoulder IR  (0-70 )         Elbow Flexion (150 )         Elbow Extension (0 )           Shoulder/Elbow Strength WFL and no pain  Date: 8/21/2018     Shoulder/Elbow Strength (/5)  Manual Muscle Test (MMT) MMT MMT MMT    Right Left Right Left Right Left   Shoulder Flexion         Supraspinatus         Shoulder Abduction         Shoulder Extension         Shoulder External Rotation         Shoulder Internal Rotation         Elbow Flexion         Elbow Extension         Other:         Other:           Shoulder Special Tests   WFL and no pain  Impingement Cluster Right (+/-) Left (+/-) Rotator Cuff Tests Right (+/-) Left (+/-)   Guillory-Stephen   Drop Arm Sign     Painful Arc   Hornblowers     Infraspinatus Test   ERLS     AC Tests Right (+/-) Left (+/-) IRLS     Active Compression   Labral Tests Right (+/-) Left (+/-)   Crossbody Adduction   Biceps Load Test II     AC Resisted Extension   Jerk Test     GH Instability Tests Right (+/-) Left (+/-) Polly Test     Sulcus Sign   Biceps Tests Right (+/-) Left (+/-)   Apprehension   Speed     Relocation   Corey cox     Other:   Other:     Other:   Other:           Treatment Today      Patient Education: Patient educated on plan of care, prognosis, PT/patient role and HEP. Patient educated on impairments related to condition and reproduction of symptoms. Patient instructed to focus on the small goals and this may be a long process to recovery, and that exercises at home are just as important as coming to therapy. Patient was educated on importance of exercise consistency and activity modification in order to see progress and change. Patient demonstrated and verbalized understanding.     Manual therapy:  None performed today    Exercises:  Exercise #1: sidelying IR stretch - 10 reps, then 10 sec hold x 3  Comment #1: seated UT and levator stretch - hold 30 sec x 2  Exercise #2: ER doorway stretch - hold 10 sec x 4  Comment #2: IR towel stretch - hold 10 sec x 3      TREATMENT MINUTES  COMMENTS   Evaluation 15    Self-care/ Home management     Manual therapy     Neuromuscular Re-education     Therapeutic Activity     Therapeutic Exercises 25 See flowsheet - postural review and POC   Gait training     Modality__________________                Total 40    Blank areas are intentional and mean the treatment did not include these items.     PT Evaluation Code: (Please list factors)  Patient History/Comorbidities: shoulder pain, obesity  Examination: decreased shoulder joint mobility and strength  Clinical Presentation: stable  Clinical Decision Making: low    Patient History/  Comorbidities Examination  (body structures and functions, activity limitations, and/or participation restrictions) Clinical Presentation Clinical Decision Making (Complexity)   No documented Comorbidities or personal factors 1-2 Elements Stable and/or uncomplicated Low   1-2 documented comorbidities or personal factor 3 Elements Evolving clinical presentation with changing characteristics Moderate   3-4 documented comorbidities or personal factors 4 or more Unstable and unpredictable High                Cynthia Godinez, PT  8/21/2018  3:02 PM

## 2021-06-20 NOTE — PROGRESS NOTES
Assessment: Medication check for Phentermine  1. High risk medication use         Plan: The following high BMI interventions were performed this visit: encouragement to exercise and weight monitoring  Stop med and radha SAHNI if any chest pain, palpations or shortness of breath. Follow-up in 1 month. This is month # 6 . Phentermine dose is 30 mg.    Patient Instructions   Track calories daily. Add 200-300 calories on the day you exercise for 2 hours.    Continue exercise.    Try to limit ice cream to 1 per week.    Phentermine refill, month # 6 at 30 mg.    Flu shot today.    If any further period / vaginal bleeding, we will refer to GYN.    CT if any pelvic symptoms.    Follow-up in 2 months.      Chief Complaint   Patient presents with     Medication Management     phetermine f/u      Subjective: Ursula Pedro comes in today for a medication check for Phentermine. No chest pain, palpations or shortness of breath. They have been on the med for  5 months. They have lost 0 pounds in the last month and 14 pounds overall. They exercise  minutes 4 days a week. They are journaling calories.  Try to keep her calories to 1200 a day.  She is not feeling too hungry and has been losing weight without calorie range.  She knows she needs to eat more calories a day when she has her 2 hour exercise day.  We did discuss her recent pelvic ultrasound results.  Endometrial lining was thin at 4 mm.  Ovaries were not seen.  Does not complain of any pelvic discomfort or unusual bloating.  Occasional bloating when she eats her high vegetable diet and with soda.  He has not had a period now since April.  FSH showed she is in menopause.  No other concerns.    Objective: /80 (Patient Site: Left Arm, Patient Position: Sitting, Cuff Size: Adult Regular)  Pulse 85  Temp 97  F (36.1  C) (Oral)   Resp 16  Wt 157 lb 4 oz (71.3 kg)  BMI 26.17 kg/m2  General: No apparent distress  CV: Regular rate and rhythm without murmur  Lungs:  Clear to auscultation bilaterally

## 2021-07-05 ENCOUNTER — COMMUNICATION - HEALTHEAST (OUTPATIENT)
Dept: FAMILY MEDICINE | Facility: CLINIC | Age: 61
End: 2021-07-05

## 2021-07-05 DIAGNOSIS — Z79.890 HORMONE REPLACEMENT THERAPY (HRT): ICD-10-CM

## 2021-07-05 RX ORDER — ESTROGEN,CON/M-PROGEST ACET 0.3-1.5MG
TABLET ORAL
Qty: 84 TABLET | Refills: 0 | Status: SHIPPED | OUTPATIENT
Start: 2021-07-05 | End: 2022-02-09

## 2021-07-05 NOTE — TELEPHONE ENCOUNTER
Telephone Encounter by Vanessa Cade RN at 7/5/2021 11:54 AM     Author: Vanessa Cade RN Service: -- Author Type: Registered Nurse    Filed: 7/5/2021 11:55 AM Encounter Date: 7/5/2021 Status: Signed    : Vanessa Cade RN (Registered Nurse)       Refill Approved    Rx renewed per Medication Renewal Policy.     [facility] Connection Triage/Med Refill 7/5/2021     Requested Prescriptions   Pending Prescriptions Disp Refills   ? PREMPRO 0.3-1.5 mg per tablet [Pharmacy Med Name: PREMPRO 0.3-1.5MG TABS] 84 tablet 0     Sig: TAKE ONE TABLET BY MOUTH ONCE DAILY       Hormone Replacement Therapy Refill Protocol Passed - 7/5/2021  9:49 AM        Passed - PCP or prescribing provider visit in past 12 months       Last office visit with prescriber/PCP: 7/29/2020 Jami Mcgarry MD OR same dept: 7/29/2020 Jami Mcgarry MD OR same specialty: 7/29/2020 Jami Mcgarry MD  Last physical: 11/11/2020 Last MTM visit: Visit date not found     Next visit within 3 mo: Visit date not found  Next physical within 3 mo: Visit date not found  Prescriber OR PCP: Jami Mcgarry MD  Last diagnosis associated with med order: 1. Hormone replacement therapy (HRT)  - PREMPRO 0.3-1.5 mg per tablet [Pharmacy Med Name: PREMPRO 0.3-1.5MG TABS]; TAKE ONE TABLET BY MOUTH ONCE DAILY  Dispense: 84 tablet; Refill: 0     If protocol passes may refill for 3 months.

## 2021-07-13 ENCOUNTER — RECORDS - HEALTHEAST (OUTPATIENT)
Dept: ADMINISTRATIVE | Facility: CLINIC | Age: 61
End: 2021-07-13

## 2021-07-21 ENCOUNTER — RECORDS - HEALTHEAST (OUTPATIENT)
Dept: ADMINISTRATIVE | Facility: CLINIC | Age: 61
End: 2021-07-21

## 2021-07-22 ENCOUNTER — RECORDS - HEALTHEAST (OUTPATIENT)
Dept: FAMILY MEDICINE | Facility: CLINIC | Age: 61
End: 2021-07-22

## 2021-07-22 DIAGNOSIS — Z12.31 OTHER SCREENING MAMMOGRAM: ICD-10-CM

## 2021-08-02 DIAGNOSIS — R45.86 MOOD CHANGE: ICD-10-CM

## 2021-08-04 RX ORDER — CITALOPRAM HYDROBROMIDE 20 MG/1
TABLET ORAL
Qty: 90 TABLET | Refills: 3 | Status: SHIPPED | OUTPATIENT
Start: 2021-08-04 | End: 2022-02-09

## 2021-09-22 DIAGNOSIS — Z79.890 HORMONE REPLACEMENT THERAPY: Primary | ICD-10-CM

## 2021-09-22 RX ORDER — ESTROGEN,CON/M-PROGEST ACET 0.3-1.5MG
TABLET ORAL
Qty: 84 TABLET | Refills: 0 | Status: SHIPPED | OUTPATIENT
Start: 2021-09-22 | End: 2022-02-09

## 2021-09-22 NOTE — TELEPHONE ENCOUNTER
"Routing refill request to provider for review/approval because:  Routing to PCP to advise, last given 84 with 0 refills, do you want patient to follow up or do you want patient to continue on this medication?    Last Written Prescription Date:  7/5/21  Last Fill Quantity: 84,  # refills: 0   Last office visit provider:  11/11/2020    Requested Prescriptions   Pending Prescriptions Disp Refills     PREMPRO 0.3-1.5 MG tablet [Pharmacy Med Name: PREMPRO 0.3-1.5MG TABS] 84 tablet 0     Sig: TAKE ONE TABLET BY MOUTH ONCE DAILY       Hormone Replacement Therapy Passed - 9/22/2021  3:03 PM        Passed - Blood pressure under 140/90 in past 12 months     BP Readings from Last 3 Encounters:   11/11/20 112/78   07/29/20 128/81   02/29/20 135/83                 Passed - Recent (12 mo) or future (30 days) visit within the authorizing provider's specialty     Patient has had an office visit with the authorizing provider or a provider within the authorizing providers department within the previous 12 mos or has a future within next 30 days. See \"Patient Info\" tab in inbasket, or \"Choose Columns\" in Meds & Orders section of the refill encounter.              Passed - Patient has mammogram in past 2 years on file if age 50-75        Passed - Medication is active on med list        Passed - Patient is 18 years of age or older        Passed - No active pregnancy on record        Passed - No positive pregnancy test on record in past 12 months             Vanessa Cade RN 09/22/21 3:31 PM  "

## 2021-10-11 ENCOUNTER — HEALTH MAINTENANCE LETTER (OUTPATIENT)
Age: 61
End: 2021-10-11

## 2021-11-08 DIAGNOSIS — I10 ESSENTIAL HYPERTENSION: ICD-10-CM

## 2021-11-08 RX ORDER — LISINOPRIL 5 MG/1
TABLET ORAL
Qty: 90 TABLET | Refills: 0 | Status: SHIPPED | OUTPATIENT
Start: 2021-11-08 | End: 2022-02-01

## 2021-12-21 DIAGNOSIS — Z78.0 MENOPAUSE: Primary | ICD-10-CM

## 2021-12-21 RX ORDER — ESTROGEN,CON/M-PROGEST ACET 0.3-1.5MG
TABLET ORAL
Qty: 84 TABLET | Refills: 0 | Status: SHIPPED | OUTPATIENT
Start: 2021-12-21 | End: 2022-07-06

## 2022-01-30 ENCOUNTER — HEALTH MAINTENANCE LETTER (OUTPATIENT)
Age: 62
End: 2022-01-30

## 2022-01-31 DIAGNOSIS — I10 ESSENTIAL HYPERTENSION: ICD-10-CM

## 2022-02-01 RX ORDER — LISINOPRIL 5 MG/1
TABLET ORAL
Qty: 90 TABLET | Refills: 0 | Status: SHIPPED | OUTPATIENT
Start: 2022-02-01 | End: 2022-05-02

## 2022-02-02 ENCOUNTER — TRANSFERRED RECORDS (OUTPATIENT)
Dept: HEALTH INFORMATION MANAGEMENT | Facility: CLINIC | Age: 62
End: 2022-02-02
Payer: COMMERCIAL

## 2022-02-09 ENCOUNTER — OFFICE VISIT (OUTPATIENT)
Dept: FAMILY MEDICINE | Facility: CLINIC | Age: 62
End: 2022-02-09
Payer: COMMERCIAL

## 2022-02-09 VITALS
SYSTOLIC BLOOD PRESSURE: 119 MMHG | HEIGHT: 65 IN | TEMPERATURE: 98.3 F | HEART RATE: 66 BPM | RESPIRATION RATE: 16 BRPM | DIASTOLIC BLOOD PRESSURE: 79 MMHG | BODY MASS INDEX: 27.2 KG/M2 | WEIGHT: 163.25 LBS

## 2022-02-09 DIAGNOSIS — R45.86 MOOD CHANGE: ICD-10-CM

## 2022-02-09 DIAGNOSIS — Z00.00 ENCOUNTER FOR PREVENTATIVE ADULT HEALTH CARE EXAMINATION: Primary | ICD-10-CM

## 2022-02-09 DIAGNOSIS — I10 ESSENTIAL HYPERTENSION: ICD-10-CM

## 2022-02-09 DIAGNOSIS — Z12.11 SCREENING FOR COLON CANCER: ICD-10-CM

## 2022-02-09 DIAGNOSIS — R73.03 PREDIABETES: ICD-10-CM

## 2022-02-09 DIAGNOSIS — E78.00 HYPERCHOLESTEREMIA: ICD-10-CM

## 2022-02-09 DIAGNOSIS — E55.9 VITAMIN D DEFICIENCY: ICD-10-CM

## 2022-02-09 DIAGNOSIS — D12.6 BENIGN NEOPLASM OF COLON, UNSPECIFIED PART OF COLON: ICD-10-CM

## 2022-02-09 DIAGNOSIS — Z12.31 VISIT FOR SCREENING MAMMOGRAM: ICD-10-CM

## 2022-02-09 DIAGNOSIS — N39.3 FEMALE STRESS INCONTINENCE: ICD-10-CM

## 2022-02-09 DIAGNOSIS — Z11.4 SCREENING FOR HIV (HUMAN IMMUNODEFICIENCY VIRUS): ICD-10-CM

## 2022-02-09 DIAGNOSIS — Z11.59 NEED FOR HEPATITIS C SCREENING TEST: ICD-10-CM

## 2022-02-09 DIAGNOSIS — C44.91 BASAL CELL CARCINOMA (BCC), UNSPECIFIED SITE: ICD-10-CM

## 2022-02-09 DIAGNOSIS — E53.8 VITAMIN B12 DEFICIENCY (NON ANEMIC): ICD-10-CM

## 2022-02-09 LAB
ALBUMIN SERPL-MCNC: 3.9 G/DL (ref 3.5–5)
ALP SERPL-CCNC: 70 U/L (ref 45–120)
ALT SERPL W P-5'-P-CCNC: 11 U/L (ref 0–45)
ANION GAP SERPL CALCULATED.3IONS-SCNC: 11 MMOL/L (ref 5–18)
AST SERPL W P-5'-P-CCNC: 17 U/L (ref 0–40)
BILIRUB SERPL-MCNC: 0.4 MG/DL (ref 0–1)
BUN SERPL-MCNC: 11 MG/DL (ref 8–22)
CALCIUM SERPL-MCNC: 9.4 MG/DL (ref 8.5–10.5)
CHLORIDE BLD-SCNC: 105 MMOL/L (ref 98–107)
CHOLEST SERPL-MCNC: 230 MG/DL
CO2 SERPL-SCNC: 22 MMOL/L (ref 22–31)
CREAT SERPL-MCNC: 0.77 MG/DL (ref 0.6–1.1)
ERYTHROCYTE [DISTWIDTH] IN BLOOD BY AUTOMATED COUNT: 11.6 % (ref 10–15)
FASTING STATUS PATIENT QL REPORTED: YES
GFR SERPL CREATININE-BSD FRML MDRD: 88 ML/MIN/1.73M2
GLUCOSE BLD-MCNC: 110 MG/DL (ref 70–125)
HBA1C MFR BLD: 5.8 %
HCT VFR BLD AUTO: 43.9 % (ref 35–47)
HCV AB SERPL QL IA: NEGATIVE
HDLC SERPL-MCNC: 50 MG/DL
HGB BLD-MCNC: 14.4 G/DL (ref 11.7–15.7)
HIV 1+2 AB+HIV1 P24 AG SERPL QL IA: NEGATIVE
LDLC SERPL CALC-MCNC: 157 MG/DL
MCH RBC QN AUTO: 29 PG (ref 26.5–33)
MCHC RBC AUTO-ENTMCNC: 32.8 G/DL (ref 31.5–36.5)
MCV RBC AUTO: 89 FL (ref 78–100)
PLATELET # BLD AUTO: 282 10E3/UL (ref 150–450)
POTASSIUM BLD-SCNC: 4.6 MMOL/L (ref 3.5–5)
PROT SERPL-MCNC: 7.3 G/DL (ref 6–8)
RBC # BLD AUTO: 4.96 10E6/UL (ref 3.8–5.2)
SODIUM SERPL-SCNC: 138 MMOL/L (ref 136–145)
TRIGL SERPL-MCNC: 113 MG/DL
TSH SERPL DL<=0.005 MIU/L-ACNC: 0.89 UIU/ML (ref 0.3–5)
VIT B12 SERPL-MCNC: 342 PG/ML (ref 213–816)
WBC # BLD AUTO: 5.9 10E3/UL (ref 4–11)

## 2022-02-09 PROCEDURE — 99213 OFFICE O/P EST LOW 20 MIN: CPT | Mod: 25 | Performed by: FAMILY MEDICINE

## 2022-02-09 PROCEDURE — 84443 ASSAY THYROID STIM HORMONE: CPT | Performed by: FAMILY MEDICINE

## 2022-02-09 PROCEDURE — 83036 HEMOGLOBIN GLYCOSYLATED A1C: CPT | Performed by: FAMILY MEDICINE

## 2022-02-09 PROCEDURE — 80053 COMPREHEN METABOLIC PANEL: CPT | Performed by: FAMILY MEDICINE

## 2022-02-09 PROCEDURE — 82607 VITAMIN B-12: CPT | Performed by: FAMILY MEDICINE

## 2022-02-09 PROCEDURE — 82306 VITAMIN D 25 HYDROXY: CPT | Performed by: FAMILY MEDICINE

## 2022-02-09 PROCEDURE — 85027 COMPLETE CBC AUTOMATED: CPT | Performed by: FAMILY MEDICINE

## 2022-02-09 PROCEDURE — 86803 HEPATITIS C AB TEST: CPT | Performed by: FAMILY MEDICINE

## 2022-02-09 PROCEDURE — 87389 HIV-1 AG W/HIV-1&-2 AB AG IA: CPT | Performed by: FAMILY MEDICINE

## 2022-02-09 PROCEDURE — 80061 LIPID PANEL: CPT | Performed by: FAMILY MEDICINE

## 2022-02-09 PROCEDURE — 99396 PREV VISIT EST AGE 40-64: CPT | Performed by: FAMILY MEDICINE

## 2022-02-09 PROCEDURE — 36415 COLL VENOUS BLD VENIPUNCTURE: CPT | Performed by: FAMILY MEDICINE

## 2022-02-09 RX ORDER — CITALOPRAM HYDROBROMIDE 20 MG/1
20 TABLET ORAL DAILY
Qty: 90 TABLET | Refills: 3 | Status: SHIPPED | OUTPATIENT
Start: 2022-02-09 | End: 2023-04-29

## 2022-02-09 ASSESSMENT — ANXIETY QUESTIONNAIRES
6. BECOMING EASILY ANNOYED OR IRRITABLE: NOT AT ALL
5. BEING SO RESTLESS THAT IT IS HARD TO SIT STILL: NOT AT ALL
3. WORRYING TOO MUCH ABOUT DIFFERENT THINGS: NOT AT ALL
1. FEELING NERVOUS, ANXIOUS, OR ON EDGE: NOT AT ALL
7. FEELING AFRAID AS IF SOMETHING AWFUL MIGHT HAPPEN: NOT AT ALL
GAD7 TOTAL SCORE: 0
2. NOT BEING ABLE TO STOP OR CONTROL WORRYING: NOT AT ALL
IF YOU CHECKED OFF ANY PROBLEMS ON THIS QUESTIONNAIRE, HOW DIFFICULT HAVE THESE PROBLEMS MADE IT FOR YOU TO DO YOUR WORK, TAKE CARE OF THINGS AT HOME, OR GET ALONG WITH OTHER PEOPLE: NOT DIFFICULT AT ALL

## 2022-02-09 ASSESSMENT — PATIENT HEALTH QUESTIONNAIRE - PHQ9
5. POOR APPETITE OR OVEREATING: NOT AT ALL
SUM OF ALL RESPONSES TO PHQ QUESTIONS 1-9: 2

## 2022-02-09 ASSESSMENT — MIFFLIN-ST. JEOR: SCORE: 1316.38

## 2022-02-09 NOTE — PATIENT INSTRUCTIONS
With your daytime drowsiness and your history of snoring please let me know if you would like a sleep consult.    Mammogram due May 26 or after.  When you set that up ask for a 3D.  The phone number is [phone].    To wean off the Prempro I recommend taking 1 pill every other day 1 month then every third day for 1 month then you can stop.    I am glad you see dermatology with your history of skin cancer.    For the stress incontinence continue the Kegel exercises.  If you wish a urology consult please let me know.    Continue citalopram 20 mg daily.  If you are feeling everything is stable going off the hormone replacement and you wish to go off the citalopram let me know and we will discuss a wean.    If the distorted hearing persists let me know I will refer you to an ENT.    Could give a try to Flonase nasal spray 2 sprays each nostril daily for a month in case this is some eustachian tube dysfunction.      Health Maintenance   Topic Date Due     ANNUAL REVIEW OF HM ORDERS  Today     HIV SCREENING  Today     HEPATITIS C SCREENING  Today     ZOSTER IMMUNIZATION (1 of 2) If you are interested in the new shingles shot, Shingrix, please check with insurance for coverage either in clinic or at a pharmacy. It is a 2 shot series 2-6 months apart.      LUNG CANCER SCREENING  Quit in [facility]     PREVENTIVE CARE VISIT  Today     COLORECTAL CANCER SCREENING  10/23/2022, ordered     MAMMO SCREENING  05/26/2022, ordered     HPV TEST  Next year     LIPID  Today     ADVANCE CARE PLANNING  Packet given     PAP  Next year     DTAP/TDAP/TD IMMUNIZATION (3 - Td or Tdap) 07/25/2028     PHQ-2  Completed     INFLUENZA VACCINE  Completed     COVID-19 Vaccine  Completed     Pneumococcal Vaccine: Pediatrics (0 to 5 Years) and At-Risk Patients (6 to 64 Years)  Age 65     IPV IMMUNIZATION  Aged Out     MENINGITIS IMMUNIZATION  Aged Out     HEPATITIS B IMMUNIZATION  Completed     Dexa scan: Due Nov. 2023      Patient Education     Wilmer  Exercises  What are Kegel exercises?  Kegels are exercises that tighten and release the pelvic muscles. These muscles wrap around both the anus and urethra (the tube that carries urine out of the body).  To find these muscles, try to stop and start the flow of urine while using the toilet.  Kegel exercises may:    Give you greater bladder control (stop or prevent urine from leaking).    Give you greater bowel control.    Increase pleasure during sex.    Ease childbirth for pregnant women.    Help men regain bladder control after prostate cancer treatment.  How can I test my muscle strength?  As you urinate (pass water), try to stop your stream of urine: Tighten the muscle around your urethra. If you can stop the stream, then you have good muscle control.  To maintain good control, you need to exercise these muscles regularly.  If you cannot stop your stream, Kegels can help you improve your muscle control.  How do I do Kegel exercises?  1. Squeeze and lift the muscles around the urethra. (You should feel the muscles lift near the urethra or tighten in your rectum.)  2. Hold them tight as you count to 5 or 10.  3. Then, slowly relax these muscles as you count to 5 or 10.  4. Repeat five times.  Do these exercises three times a day: Five times in the morning, five times in the afternoon and five times at night.  Where to exercise  You may do the exercises anywhere and anytime. For instance:    At red traffic lights.    During TV commercials.    During coffee breaks.    While waiting for the bus.    At the grocery store.    When brushing your teeth.    During sex (for women).  Common mistakes  Don't use the muscles in your stomach, legs or buttocks. Your leg and buttock muscles should not move during these exercises.  To check your stomach muscles, put your hand on your stomach when you do your Kegels. If you feel your stomach move, then you are using the wrong muscles.  Can these exercises hurt me?  No, these  exercises cannot harm you in any way. You should find them relaxing and easy.  Back or stomach pain may mean you are using your stomach muscles.  If you get headaches and your chest muscles are tense, you are likely holding your breath. Try to breathe normally during your Kegels.  When will I notice a change?  If you have weak bladder control, you will notice a change after four to six weeks of daily exercise. After three months, you will see an even bigger difference.  Make these exercises a habit: Tighten the muscles when you walk, before you cough, as you stand up and on the way to the bathroom.  For informational purposes only. Not to replace the advice of your health care provider. Copyright   1981, 2009 [employer]. All rights reserved. [employer] [id number] - REV 06/16.  For informational purposes only. Not to replace the advice of your health care provider.  Copyright   2018 [employer]. All rights reserved.

## 2022-02-09 NOTE — PROGRESS NOTES
SUBJECTIVE:   CC: Ursula Pedro is an 61 year old woman who presents for preventive health visit.       Patient has been advised of split billing requirements and indicates understanding: Yes  Healthy Habits:     Getting at least 3 servings of Calcium per day:  Yes    Bi-annual eye exam:  Yes    Dental care twice a year:  Yes    Sleep apnea or symptoms of sleep apnea:  Daytime drowsiness    Diet:  Regular (no restrictions)    Frequency of exercise:  2-3 days/week    Duration of exercise:  45-60 minutes    Taking medications regularly:  Yes    Medication side effects:  None    PHQ-2 Total Score: 0    Additional concerns today:  No    Hx of Covid: In Oct. 2020.  Still daytime drowsiness since.  She does snore on her back.  No pauses in her breathing.    Hormone replacement therapy:  On for 2-3 years.  Does not have hot flashed or night sweats.  Thinking about weaning off.      Mood:  Took Citalopram for hot flashes and irritability and it helped.  Would like to stay on for now.    ENT:  Since Oct. 2021, has had some sensitivity to sound and some sounds sound like she is in a tunnel here and there.  Not ringing.  Every 2-3 weeks.  Mainly TV related.    Health Maintenance   Topic Date Due     ANNUAL REVIEW OF HM ORDERS  Today     HIV SCREENING  Today     HEPATITIS C SCREENING  Today     ZOSTER IMMUNIZATION (1 of 2) If you are interested in the new shingles shot, Shingrix, please check with insurance for coverage either in clinic or at a pharmacy. It is a 2 shot series 2-6 months apart.      LUNG CANCER SCREENING  Quit in [facility]     PREVENTIVE CARE VISIT  Today     COLORECTAL CANCER SCREENING  10/23/2022, ordered     MAMMO SCREENING  05/26/2022, ordered     HPV TEST  Next year     LIPID  Today     ADVANCE CARE PLANNING  Packet given     PAP  Next year     DTAP/TDAP/TD IMMUNIZATION (3 - Td or Tdap) 07/25/2028     PHQ-2  Completed     INFLUENZA VACCINE  Completed     COVID-19 Vaccine  Completed     Pneumococcal  Vaccine: Pediatrics (0 to 5 Years) and At-Risk Patients (6 to 64 Years)  Age 65     IPV IMMUNIZATION  Aged Out     MENINGITIS IMMUNIZATION  Aged Out     HEPATITIS B IMMUNIZATION  Completed     Dexa scan: Due Nov. 2023            Today's PHQ-2 Score:   PHQ-2 ( 1999 Pfizer) 2/9/2022   Q1: Little interest or pleasure in doing things 0   Q2: Feeling down, depressed or hopeless 0   PHQ-2 Score 0   Q1: Little interest or pleasure in doing things Not at all   Q2: Feeling down, depressed or hopeless Not at all   PHQ-2 Score 0       Abuse: Current or Past (Physical, Sexual or Emotional) - No  Do you feel safe in your environment? Yes        Social History     Tobacco Use     Smoking status: Former Smoker     Packs/day: 0.00     Smokeless tobacco: Never Used   Substance Use Topics     Alcohol use: Yes         Alcohol Use 2/9/2022   Prescreen: >3 drinks/day or >7 drinks/week? No       Reviewed orders with patient.  Reviewed health maintenance and updated orders accordingly - Yes  Lab work is in process    Breast Cancer Screening:    FHS-7:   Breast CA Risk Assessment (FHS-7) 2/9/2022   Did any of your first-degree relatives have breast or ovarian cancer? No   Did any of your relatives have bilateral breast cancer? No   Did any man in your family have breast cancer? No   Did any woman in your family have breast and ovarian cancer? Yes   Did any woman in your family have breast cancer before age 50 y? No   Do you have 2 or more relatives with breast and/or ovarian cancer? No   Do you have 2 or more relatives with breast and/or bowel cancer? Yes       Mammogram Screening: Recommended annual mammography  Pertinent mammograms are reviewed under the imaging tab.    History of abnormal Pap smear: NO - age 30- 65 PAP every 3 years recommended  PAP / HPV Latest Ref Rng & Units 11/11/2020 7/25/2018   PAP Negative for squamous intraepithelial lesion or malignancy. Negative for squamous intraepithelial lesion or malignancy  Electronically  "signed by Shala Taveras CT (ASCP) on 11/19/2020 at  9:44 AM   Negative for squamous intraepithelial lesion or malignancy  Electronically signed by Shala Taveras CT (ASCP) on 8/1/2018 at  2:53 PM     HPV16 NEG Negative Negative   HPV18 NEG Negative Negative   HRHPV NEG Negative Negative     Reviewed and updated as needed this visit by clinical staff  Tobacco  Allergies  Meds             Reviewed and updated as needed this visit by Provider                   Review of Systems  CONSTITUTIONAL: NEGATIVE for fever, chills, change in weight  INTEGUMENTARY/SKIN: NEGATIVE for worrisome rashes, moles or lesions  EYES: NEGATIVE for vision changes or irritation  ENT: NEGATIVE for ear, mouth and throat problems  RESP: NEGATIVE for significant cough or SOB  BREAST: NEGATIVE for masses, tenderness or discharge  CV: NEGATIVE for chest pain, palpitations or peripheral edema  GI: NEGATIVE for nausea, abdominal pain, heartburn, or change in bowel habits  : NEGATIVE for unusual urinary or vaginal symptoms. No vaginal bleeding.  MUSCULOSKELETAL: NEGATIVE for significant arthralgias or myalgia  NEURO: NEGATIVE for weakness, dizziness or paresthesias  PSYCHIATRIC: NEGATIVE for changes in mood or affect      OBJECTIVE:   /86 (BP Location: Left arm, Patient Position: Sitting, Cuff Size: Adult Regular)   Pulse 73   Temp 98.3  F (36.8  C) (Oral)   Resp 16   Ht 1.651 m (5' 5\")   Wt 74 kg (163 lb 4 oz)   BMI 27.17 kg/m    Physical Exam  GENERAL APPEARANCE: healthy, alert and no distress  EYES: Eyes grossly normal to inspection, PERRL and conjunctivae and sclerae normal  HENT: ear canals and TM's normal, nose and mouth without ulcers or lesions, oropharynx clear and oral mucous membranes moist  NECK: no adenopathy, no asymmetry, masses, or scars and thyroid normal to palpation  RESP: lungs clear to auscultation - no rales, rhonchi or wheezes  BREAST: normal without masses, tenderness or nipple discharge and no " palpable axillary masses or adenopathy  CV: regular rate and rhythm, normal S1 S2, no S3 or S4, no murmur, click or rub, no peripheral edema and peripheral pulses strong  ABDOMEN: soft, nontender, no hepatosplenomegaly, no masses and bowel sounds normal   (female): normal female external genitalia, normal urethral meatus, normal adnexae, and uterus without masses or abnormal discharge  MS: no musculoskeletal defects are noted and gait is age appropriate without ataxia  SKIN: no suspicious lesions or rashes  NEURO: Normal strength and tone, sensory exam grossly normal, mentation intact and speech normal  PSYCH: mentation appears normal and affect normal/bright    Diagnostic Test Results:  Labs reviewed in Epic    ASSESSMENT/PLAN:       ICD-10-CM    1. Encounter for preventative adult health care examination  Z00.00    2. Mood change  R45.86 TSH with free T4 reflex     citalopram (CELEXA) 20 MG tablet     TSH with free T4 reflex   3. Screening for HIV (human immunodeficiency virus)  Z11.4 HIV Antigen Antibody Combo     HIV Antigen Antibody Combo   4. Need for hepatitis C screening test  Z11.59 Hepatitis C antibody     Hepatitis C antibody   5. Basal cell carcinoma (BCC), unspecified site  C44.91    6. Benign neoplasm of colon, unspecified part of colon  D12.6    7. Vitamin D deficiency  E55.9 Vitamin D Deficiency     Vitamin D Deficiency   8. Hypercholesteremia  E78.00 Lipid Profile     Lipid Profile   9. Prediabetes  R73.03 Hemoglobin A1c     Hemoglobin A1c   10. Hypertension  I10    11. Female stress incontinence  N39.3    12. Essential hypertension  I10 Comprehensive metabolic panel     CBC with platelets     Comprehensive metabolic panel     CBC with platelets   13. Vitamin B12 deficiency (non anemic)  E53.8 Vitamin B12     Vitamin B12   14. Visit for screening mammogram  Z12.31 *MA Screening Digital Bilateral   15. Screening for colon cancer  Z12.11 Adult Gastro Ref - Procedure Only     With your daytime  "drowsiness and your history of snoring please let me know if you would like a sleep consult.    Mammogram due May 26 or after.  When you set that up ask for a 3D.  The phone number is [phone].    To wean off the Prempro I recommend taking 1 pill every other day 1 month then every third day for 1 month then you can stop.    I am glad you see dermatology with your history of skin cancer.    For the stress incontinence continue the Kegel exercises.  If you wish a urology consult please let me know.    Continue citalopram 20 mg daily.  If you are feeling everything is stable going off the hormone replacement and you wish to go off the citalopram let me know and we will discuss a wean.    If the distorted hearing persists let me know I will refer you to an ENT.    Could give a try to Flonase nasal spray 2 sprays each nostril daily for a month in case this is some eustachian tube dysfunction.      Patient has been advised of split billing requirements and indicates understanding: Yes    COUNSELING:  Reviewed preventive health counseling, as reflected in patient instructions       Regular exercise       Healthy diet/nutrition    Estimated body mass index is 27.17 kg/m  as calculated from the following:    Height as of this encounter: 1.651 m (5' 5\").    Weight as of this encounter: 74 kg (163 lb 4 oz).    Weight management plan: Discussed healthy diet and exercise guidelines    She reports that she has quit smoking. She smoked 0.00 packs per day. She has never used smokeless tobacco.      Counseling Resources:  ATP IV Guidelines  Pooled Cohorts Equation Calculator  Breast Cancer Risk Calculator  BRCA-Related Cancer Risk Assessment: FHS-7 Tool  FRAX Risk Assessment  ICSI Preventive Guidelines  Dietary Guidelines for Americans, 2010  USDA's MyPlate  ASA Prophylaxis  Lung CA Screening    Jami Mcgarry MD  [facility]  "

## 2022-02-10 LAB — DEPRECATED CALCIDIOL+CALCIFEROL SERPL-MC: 28 UG/L (ref 30–80)

## 2022-02-10 ASSESSMENT — ANXIETY QUESTIONNAIRES: GAD7 TOTAL SCORE: 0

## 2022-03-23 ENCOUNTER — TRANSFERRED RECORDS (OUTPATIENT)
Dept: HEALTH INFORMATION MANAGEMENT | Facility: CLINIC | Age: 62
End: 2022-03-23
Payer: COMMERCIAL

## 2022-05-02 DIAGNOSIS — I10 ESSENTIAL HYPERTENSION: ICD-10-CM

## 2022-05-02 RX ORDER — LISINOPRIL 5 MG/1
TABLET ORAL
Qty: 90 TABLET | Refills: 3 | Status: SHIPPED | OUTPATIENT
Start: 2022-05-02 | End: 2023-04-16

## 2022-06-01 ENCOUNTER — HOSPITAL ENCOUNTER (OUTPATIENT)
Dept: MAMMOGRAPHY | Facility: CLINIC | Age: 62
Discharge: HOME | End: 2022-06-01
Attending: FAMILY MEDICINE | Admitting: FAMILY MEDICINE
Payer: COMMERCIAL

## 2022-06-01 DIAGNOSIS — Z12.31 VISIT FOR SCREENING MAMMOGRAM: ICD-10-CM

## 2022-06-01 PROCEDURE — 77067 SCR MAMMO BI INCL CAD: CPT

## 2022-07-05 ENCOUNTER — MYC MEDICAL ADVICE (OUTPATIENT)
Dept: FAMILY MEDICINE | Facility: CLINIC | Age: 62
End: 2022-07-05

## 2022-07-05 DIAGNOSIS — Z79.899 HIGH RISK MEDICATION USE: Primary | ICD-10-CM

## 2022-07-06 ENCOUNTER — IMMUNIZATION (OUTPATIENT)
Dept: NURSING | Facility: CLINIC | Age: 62
End: 2022-07-06
Payer: COMMERCIAL

## 2022-07-06 PROCEDURE — 91306 COVID-19,PF,MODERNA (18+ YRS BOOSTER .25ML): CPT

## 2022-07-06 PROCEDURE — 0064A COVID-19,PF,MODERNA (18+ YRS BOOSTER .25ML): CPT

## 2022-07-06 RX ORDER — PHENTERMINE HYDROCHLORIDE 15 MG/1
15 CAPSULE ORAL EVERY MORNING
Qty: 30 CAPSULE | Refills: 2 | Status: SHIPPED | OUTPATIENT
Start: 2022-07-06 | End: 2022-08-17

## 2022-08-17 ENCOUNTER — OFFICE VISIT (OUTPATIENT)
Dept: FAMILY MEDICINE | Facility: CLINIC | Age: 62
End: 2022-08-17
Payer: COMMERCIAL

## 2022-08-17 VITALS
BODY MASS INDEX: 26.71 KG/M2 | WEIGHT: 160.5 LBS | SYSTOLIC BLOOD PRESSURE: 125 MMHG | RESPIRATION RATE: 16 BRPM | DIASTOLIC BLOOD PRESSURE: 87 MMHG | HEART RATE: 90 BPM | TEMPERATURE: 97.9 F

## 2022-08-17 DIAGNOSIS — Z79.899 HIGH RISK MEDICATION USE: Primary | ICD-10-CM

## 2022-08-17 DIAGNOSIS — Z79.899 CONTROLLED SUBSTANCE AGREEMENT SIGNED: ICD-10-CM

## 2022-08-17 PROCEDURE — 99213 OFFICE O/P EST LOW 20 MIN: CPT | Performed by: FAMILY MEDICINE

## 2022-08-17 RX ORDER — PHENTERMINE HYDROCHLORIDE 15 MG/1
15 CAPSULE ORAL EVERY MORNING
Qty: 30 CAPSULE | Refills: 2 | Status: SHIPPED | OUTPATIENT
Start: 2022-08-17 | End: 2022-12-12

## 2022-08-17 ASSESSMENT — ANXIETY QUESTIONNAIRES
IF YOU CHECKED OFF ANY PROBLEMS ON THIS QUESTIONNAIRE, HOW DIFFICULT HAVE THESE PROBLEMS MADE IT FOR YOU TO DO YOUR WORK, TAKE CARE OF THINGS AT HOME, OR GET ALONG WITH OTHER PEOPLE: NOT DIFFICULT AT ALL
7. FEELING AFRAID AS IF SOMETHING AWFUL MIGHT HAPPEN: NOT AT ALL
7. FEELING AFRAID AS IF SOMETHING AWFUL MIGHT HAPPEN: NOT AT ALL
GAD7 TOTAL SCORE: 0
6. BECOMING EASILY ANNOYED OR IRRITABLE: NOT AT ALL
3. WORRYING TOO MUCH ABOUT DIFFERENT THINGS: NOT AT ALL
GAD7 TOTAL SCORE: 0
5. BEING SO RESTLESS THAT IT IS HARD TO SIT STILL: NOT AT ALL
8. IF YOU CHECKED OFF ANY PROBLEMS, HOW DIFFICULT HAVE THESE MADE IT FOR YOU TO DO YOUR WORK, TAKE CARE OF THINGS AT HOME, OR GET ALONG WITH OTHER PEOPLE?: NOT DIFFICULT AT ALL
4. TROUBLE RELAXING: NOT AT ALL
GAD7 TOTAL SCORE: 0
1. FEELING NERVOUS, ANXIOUS, OR ON EDGE: NOT AT ALL
2. NOT BEING ABLE TO STOP OR CONTROL WORRYING: NOT AT ALL

## 2022-08-17 ASSESSMENT — PATIENT HEALTH QUESTIONNAIRE - PHQ9
SUM OF ALL RESPONSES TO PHQ QUESTIONS 1-9: 1
SUM OF ALL RESPONSES TO PHQ QUESTIONS 1-9: 1
10. IF YOU CHECKED OFF ANY PROBLEMS, HOW DIFFICULT HAVE THESE PROBLEMS MADE IT FOR YOU TO DO YOUR WORK, TAKE CARE OF THINGS AT HOME, OR GET ALONG WITH OTHER PEOPLE: NOT DIFFICULT AT ALL

## 2022-08-17 NOTE — PATIENT INSTRUCTIONS
Great job with your weight loss of 6 pounds!    Will continue phentermine 15 mg.  This is prescription #2.  Typically recommend for a max of 12 to 15 months consistently.    Please set an in person or virtual visit in 3 months for med check.    Today we will sign a controlled substance agreement and do the urine tox.    Prescription monitoring program reviewed and patient following the controlled substance agreement.    Physical due in February.

## 2022-08-17 NOTE — LETTER
[facility]  08/17/22  Patient: Ursula Pedro  YOB: 1960  Medical Record Number: [id number]                                                                                  Non-Opioid Controlled Substance Agreement    This is an agreement between you and your provider regarding safe and appropriate use of controlled substances prescribed by your care team. Controlled substances are?medicines that can cause physical and mental dependence (abuse).     There are strict laws about having and using these medicines. We here at [facility] are  committed to working with you in your efforts to get better. To support you in this work, we'll help you schedule regular office appointments for medicine refills. If we must cancel or change your appointment for any reason, we'll make sure you have enough medicine to last until your next appointment.     As a Provider, I will:     Listen carefully to your concerns while treating you with respect.     Recommend a treatment plan that I believe is in your best interest and may involve therapies other than medicine.      Talk with you often about the possible benefits and the risk of harm of any medicine that we prescribe for you.    Assess the safety of this medicine and check how well it works.      Provide a plan on how to taper (discontinue or go off) using this medicine if the decision is made to stop its use.      ::  As a Patient, I understand controlled substances:       Are prescribed by my care provider to help me function or work and manage my condition(s).?    Are strong medicines and can cause serious side effects.       Need to be taken exactly as prescribed.?Combining controlled substances with certain medicines or chemicals (such as illegal drugs, alcohol, sedatives, sleeping pills, and benzodiazepines) can be dangerous or even fatal.? If I stop taking my medicines suddenly, I may have severe withdrawal symptoms.      The risks, benefits, and side effects of these medicine(s) were explained to me. I agree that:    1. I will take part in other treatments as advised by my care team. This may be psychiatry or counseling, physical therapy, behavioral therapy, group treatment or a referral to specialist.    2. I will keep all my appointments and understand this is part of the monitoring of controlled substances.?My care team may require an office visit for EVERY controlled substance refill. If I miss appointments or don t follow instructions, my care team may stop my medicine    3. I will take my medicines as prescribed. I will not change the dose or schedule unless my care team tells me to. There will be no refills if I run out early.      4. I may be asked to come to the clinic and complete a urine drug test or complete a pill count. If I don t give a urine sample or participate in a pill count, the care team may stop my medicine.    5. I will only receive controlled substance prescriptions from this clinic. If I am treated by another provider, I will tell them that I am taking controlled substances and that I have a treatment agreement with this provider. I will inform my [facility] care team within one business day if I am given a prescription for any controlled substance by another healthcare provider. My [facility] care team can contact other providers and pharmacists about my use of any medicines.    6. It is up to me to make sure that I don't run out of my medicines on weekends or holidays.?If my care team is willing to refill my prescription without a visit, I must request refills only during office hours. Refills may take up to 3 business days to process. I will use one pharmacy to fill all my controlled substance prescriptions. I will notify the clinic about any changes to my insurance or medicine availability.    7. I am responsible for my prescriptions. If the medicine/prescription is lost, stolen or  destroyed, it will not be replaced.?I also agree not to share controlled substance medicines with anyone.     8. I am aware I should not use any illegal or recreational drugs. I agree not to drink alcohol unless my care team says I can.     9. If I enroll in the [place] Medical Cannabis program, I will tell my care team before my next refill.    10. I will tell my care team right away if I become pregnant, have a new medical problem treated outside of my regular clinic, or have a change in my medicines.     11. I understand that this medicine can affect my thinking, judgment and reaction time.? Alcohol and drugs affect the brain and body, which can affect the safety of my driving. Being under the influence of alcohol or drugs can affect my decision-making, behaviors, personal safety and the safety of others. Driving while impaired (DWI) can occur if a person is driving, operating or in physical control of a car, motorcycle, boat, snowmobile, ATV, motorbike, off-road vehicle or any other motor vehicle ([place] Statute 169A.20). I understand the risk if I choose to drive or operate any vehicle or machinery.    I understand that if I do not follow any of the conditions above, my prescriptions or treatment may be stopped or changed.   I agree that my provider, clinic care team and pharmacy may work with any city, state or federal law enforcement agency that investigates the misuse, sale or other diversion of my controlled medicine. I will allow my provider to discuss my care with, or share a copy of, this agreement with any other treating provider, pharmacy or emergency room where I receive care.     I have read this agreement and have asked questions about anything I did not understand.    ________________________________________________________  Patient Signature - Ursula Pedro     ___________________                   Date     ________________________________________________________  Provider Signature - Jami PAZ  Malgorzata, MD       ___________________                   Date     ________________________________________________________  Witness Signature (required if provider not present while patient signing)          ___________________                   Date

## 2022-08-17 NOTE — PROGRESS NOTES
"  Assessment & Plan       ICD-10-CM    1. High risk medication use  Z79.899 phentermine (ADIPEX-P) 15 MG capsule   2. Controlled substance agreement signed-CSA and urine tox done for Phentermine 8-17-22  Z79.899      Great job with your weight loss of 6 pounds!    Will continue phentermine 15 mg.  This is prescription #2.  Typically recommend for a max of 12 to 15 months consistently.    Please set an in person or virtual visit in 3 months for med check.    Today we will sign a controlled substance agreement and do the urine tox.    Prescription monitoring program reviewed and patient following the controlled substance agreement.    Physical due in February.      20 minutes spent on the date of the encounter doing chart review, history and exam, documentation and further activities per the note       BMI:   Estimated body mass index is 26.71 kg/m  as calculated from the following:    Height as of 2/9/22: 1.651 m (5' 5\").    Weight as of this encounter: 72.8 kg (160 lb 8 oz).   Weight management plan: Discussed healthy diet and exercise guidelines        No follow-ups on file.    Jami Mcgarry MD  [facility]    Sue Vergara is a 62 year old, presenting for the following health issues:  Recheck Medication (Phentermine follow up)      History of Present Illness       Reason for visit:  Medication management for weight loss    She eats 4 or more servings of fruits and vegetables daily.She consumes 0 sweetened beverage(s) daily.She exercises with enough effort to increase her heart rate 60 or more minutes per day.  She exercises with enough effort to increase her heart rate 4 days per week.   She is taking medications regularly.    Today's PHQ-9         PHQ-9 Total Score: 1    PHQ-9 Q9 Thoughts of better off dead/self-harm past 2 weeks :   Not at all    How difficult have these problems made it for you to do your work, take care of things at home, or get along with other people: " "Not difficult at all  Today's JESUS-7 Score: 0     High risk med use: Had been off phentermine for years and restarted it 1 month ago.  Using loose it amrita to track her calories and food intake, macronutrients etc.  Walking 3-4 miles a day or club 3 days a week, exercising 7 day a week.  No side effects of the medication such as chest pain, palpitations or shortness of breath.  Lost 6 lbs!  Had a 8 days river cruise in [place].  Script 32.    BP:  Check at work, usually 120's over mid 70\"s.    Medication Followup of Phentermine 15 mg daily     Taking Medication as prescribed: yes- but didn't take for 1 week while on vacation, sometimes forgets to take     Side Effects:  None    Medication Helping Symptoms:  yes        Review of Systems         Objective    /87 (BP Location: Left arm, Patient Position: Sitting, Cuff Size: Adult Regular)   Pulse 90   Temp 97.9  F (36.6  C) (Oral)   Resp 16   Wt 72.8 kg (160 lb 8 oz)   BMI 26.71 kg/m    Body mass index is 26.71 kg/m .  Physical Exam   GENERAL: healthy, alert and no distress  RESP: lungs clear to auscultation - no rales, rhonchi or wheezes  CV: regular rate and rhythm, normal S1 S2, no S3 or S4, no murmur, click or rub, no peripheral edema and peripheral pulses strong                    .  ..  "

## 2022-09-24 ENCOUNTER — HEALTH MAINTENANCE LETTER (OUTPATIENT)
Age: 62
End: 2022-09-24

## 2022-12-07 ENCOUNTER — OFFICE VISIT (OUTPATIENT)
Dept: FAMILY MEDICINE | Facility: CLINIC | Age: 62
End: 2022-12-07
Payer: COMMERCIAL

## 2022-12-07 VITALS
HEIGHT: 65 IN | TEMPERATURE: 97.4 F | WEIGHT: 159 LBS | HEART RATE: 71 BPM | SYSTOLIC BLOOD PRESSURE: 125 MMHG | DIASTOLIC BLOOD PRESSURE: 80 MMHG | OXYGEN SATURATION: 98 % | BODY MASS INDEX: 26.49 KG/M2

## 2022-12-07 DIAGNOSIS — E66.3 OVERWEIGHT (BMI 25.0-29.9): Primary | ICD-10-CM

## 2022-12-07 DIAGNOSIS — Z79.899 HIGH RISK MEDICATION USE: ICD-10-CM

## 2022-12-07 DIAGNOSIS — R45.86 MOOD CHANGE: ICD-10-CM

## 2022-12-07 LAB — CREAT UR-MCNC: 36 MG/DL

## 2022-12-07 PROCEDURE — 99214 OFFICE O/P EST MOD 30 MIN: CPT | Performed by: FAMILY MEDICINE

## 2022-12-07 PROCEDURE — 80307 DRUG TEST PRSMV CHEM ANLYZR: CPT | Performed by: FAMILY MEDICINE

## 2022-12-07 RX ORDER — PHENTERMINE HYDROCHLORIDE 15 MG/1
15 CAPSULE ORAL EVERY MORNING
Qty: 30 CAPSULE | Refills: 2 | Status: CANCELLED | OUTPATIENT
Start: 2022-12-07

## 2022-12-07 NOTE — PROGRESS NOTES
Assessment & Plan     Overweight (BMI 25.0-29.9)  High risk medication use  Has had weight loss success with phentermine in the past.  Last on this medication 2 months ago and has subsequently gained 3 pounds.  Interested in restarting prior dose of 50 mg daily.  Blood pressure well controlled on low-dose lisinopril.  Controlled substance agreement previously signed.  Typically follows with Dr. Mcgarry for this medication, and we discussed this is a high risk medication I do not tend to prescribe.  We will update patient's UDS per Dr. Mcgarry's last note and defer refills to PCP.  - Drug Confirmation Panel Urine with Creat - lab collect    Mood change  Currently on Celexa 20 mg daily for menopausal symptoms and mood.  She may be interested in weaning off of this.  Discussed going down to 10 mg daily x2 weeks before stopping to prevent rebound symptoms.    Celena Sanchez DO  [facility]    Sue Vergara is a 62 year old, presenting for the following health issues:  Recheck Medication (phentermine)      History of Present Illness       Reason for visit:  Med check=phentermine    She eats 4 or more servings of fruits and vegetables daily.She consumes 0 sweetened beverage(s) daily.She exercises with enough effort to increase her heart rate 30 to 60 minutes per day.  She exercises with enough effort to increase her heart rate 3 or less days per week.   She is taking medications regularly.     Has been out of phentermine for 2 months.  Went on a vacation with girlfriends and has since gained 3 pounds.  Has had success with phentermine in the past.  Previously followed with [facility] in 2017 and got up to 148 pounds.  Goal weight would be 140 pounds but she has not been this weight for quite some time.  She participates in exercise 3 times per week.  During the warmer months she walks about a mile a day.  No history of heart disease.  Blood pressure well  "controlled on low-dose lisinopril.    Had gone on Celexa for menopausal symptoms.  Brother passed away during this time so feels the medication also helps for this.  Is interested in weaning off of this since mood has been stable.  Brother passed from colon cancer.  She is scheduled for colonoscopy.       Objective    /80   Pulse 71   Temp 97.4  F (36.3  C)   Ht 1.651 m (5' 5\")   Wt 72.1 kg (159 lb)   SpO2 98%   BMI 26.46 kg/m    Body mass index is 26.46 kg/m .  Physical Exam   GENERAL: healthy, alert and no distress  NECK: no adenopathy, no asymmetry, masses, or scars and thyroid normal to palpation  RESP: lungs clear to auscultation - no rales, rhonchi or wheezes  CV: regular rate and rhythm, normal S1 S2, no S3 or S4, no murmur  PSYCH: mentation appears normal, affect normal/bright        "

## 2022-12-07 NOTE — PATIENT INSTRUCTIONS
Please call your insurance company to check on Shingrix vaccine coverage (the updated shingles vaccine)    If you would like to wean off the Celexa please go down to 10 mg daily x2 weeks before stopping

## 2022-12-12 RX ORDER — PHENTERMINE HYDROCHLORIDE 15 MG/1
15 CAPSULE ORAL EVERY MORNING
Qty: 30 CAPSULE | Refills: 2 | Status: SHIPPED | OUTPATIENT
Start: 2022-12-12 | End: 2023-05-10

## 2023-02-08 ASSESSMENT — ENCOUNTER SYMPTOMS
PARESTHESIAS: 0
PALPITATIONS: 0
HEARTBURN: 0
SORE THROAT: 0
HEADACHES: 0
FEVER: 0
ABDOMINAL PAIN: 0
HEMATOCHEZIA: 0
EYE PAIN: 0
NAUSEA: 1
FREQUENCY: 0
WEAKNESS: 0
COUGH: 0
HEMATURIA: 0
CONSTIPATION: 0
NERVOUS/ANXIOUS: 0
MYALGIAS: 0
JOINT SWELLING: 0
DIZZINESS: 0
DYSURIA: 0
DIARRHEA: 1
SHORTNESS OF BREATH: 0
CHILLS: 0
ARTHRALGIAS: 1
BREAST MASS: 0

## 2023-02-15 ENCOUNTER — OFFICE VISIT (OUTPATIENT)
Dept: FAMILY MEDICINE | Facility: CLINIC | Age: 63
End: 2023-02-15
Payer: COMMERCIAL

## 2023-02-15 VITALS
HEIGHT: 65 IN | HEART RATE: 78 BPM | BODY MASS INDEX: 26.33 KG/M2 | RESPIRATION RATE: 16 BRPM | TEMPERATURE: 96.3 F | OXYGEN SATURATION: 98 % | WEIGHT: 158 LBS | DIASTOLIC BLOOD PRESSURE: 84 MMHG | SYSTOLIC BLOOD PRESSURE: 136 MMHG

## 2023-02-15 DIAGNOSIS — G62.9 POLYNEUROPATHY: ICD-10-CM

## 2023-02-15 DIAGNOSIS — E55.9 VITAMIN D DEFICIENCY: ICD-10-CM

## 2023-02-15 DIAGNOSIS — R20.8 WARMNESS: ICD-10-CM

## 2023-02-15 DIAGNOSIS — E78.00 HYPERCHOLESTEREMIA: ICD-10-CM

## 2023-02-15 DIAGNOSIS — Z78.0 MENOPAUSE: ICD-10-CM

## 2023-02-15 DIAGNOSIS — Z00.00 ENCOUNTER FOR PREVENTATIVE ADULT HEALTH CARE EXAMINATION: Primary | ICD-10-CM

## 2023-02-15 DIAGNOSIS — I10 ESSENTIAL HYPERTENSION: ICD-10-CM

## 2023-02-15 DIAGNOSIS — E53.8 VITAMIN B12 DEFICIENCY (NON ANEMIC): ICD-10-CM

## 2023-02-15 DIAGNOSIS — C44.91 BASAL CELL CARCINOMA (BCC), UNSPECIFIED SITE: ICD-10-CM

## 2023-02-15 DIAGNOSIS — Z12.11 SCREEN FOR COLON CANCER: ICD-10-CM

## 2023-02-15 DIAGNOSIS — R73.03 PREDIABETES: ICD-10-CM

## 2023-02-15 DIAGNOSIS — D12.6 ADENOMATOUS POLYP OF COLON, UNSPECIFIED PART OF COLON: ICD-10-CM

## 2023-02-15 DIAGNOSIS — Z12.31 VISIT FOR SCREENING MAMMOGRAM: ICD-10-CM

## 2023-02-15 LAB
ALBUMIN SERPL BCG-MCNC: 4.4 G/DL (ref 3.5–5.2)
ALP SERPL-CCNC: 77 U/L (ref 35–104)
ALT SERPL W P-5'-P-CCNC: 15 U/L (ref 10–35)
ANION GAP SERPL CALCULATED.3IONS-SCNC: 13 MMOL/L (ref 7–15)
AST SERPL W P-5'-P-CCNC: 25 U/L (ref 10–35)
BILIRUB SERPL-MCNC: 0.2 MG/DL
BUN SERPL-MCNC: 15.3 MG/DL (ref 8–23)
CALCIUM SERPL-MCNC: 9.2 MG/DL (ref 8.8–10.2)
CHLORIDE SERPL-SCNC: 101 MMOL/L (ref 98–107)
CHOLEST SERPL-MCNC: 225 MG/DL
CREAT SERPL-MCNC: 0.84 MG/DL (ref 0.51–0.95)
DEPRECATED HCO3 PLAS-SCNC: 24 MMOL/L (ref 22–29)
ERYTHROCYTE [DISTWIDTH] IN BLOOD BY AUTOMATED COUNT: 11.6 % (ref 10–15)
GFR SERPL CREATININE-BSD FRML MDRD: 79 ML/MIN/1.73M2
GLUCOSE SERPL-MCNC: 95 MG/DL (ref 70–99)
HBA1C MFR BLD: 5.9 % (ref 0–5.6)
HCT VFR BLD AUTO: 44.3 % (ref 35–47)
HDLC SERPL-MCNC: 51 MG/DL
HGB BLD-MCNC: 14.7 G/DL (ref 11.7–15.7)
LDLC SERPL CALC-MCNC: 152 MG/DL
MCH RBC QN AUTO: 29.1 PG (ref 26.5–33)
MCHC RBC AUTO-ENTMCNC: 33.2 G/DL (ref 31.5–36.5)
MCV RBC AUTO: 88 FL (ref 78–100)
NONHDLC SERPL-MCNC: 174 MG/DL
PLATELET # BLD AUTO: 316 10E3/UL (ref 150–450)
POTASSIUM SERPL-SCNC: 4.5 MMOL/L (ref 3.4–5.3)
PROT SERPL-MCNC: 7.4 G/DL (ref 6.4–8.3)
RBC # BLD AUTO: 5.06 10E6/UL (ref 3.8–5.2)
SODIUM SERPL-SCNC: 138 MMOL/L (ref 136–145)
TRIGL SERPL-MCNC: 112 MG/DL
TSH SERPL DL<=0.005 MIU/L-ACNC: 0.89 UIU/ML (ref 0.3–4.2)
VIT B12 SERPL-MCNC: 1248 PG/ML (ref 232–1245)
WBC # BLD AUTO: 6.9 10E3/UL (ref 4–11)

## 2023-02-15 PROCEDURE — 82306 VITAMIN D 25 HYDROXY: CPT | Performed by: FAMILY MEDICINE

## 2023-02-15 PROCEDURE — 87624 HPV HI-RISK TYP POOLED RSLT: CPT | Performed by: FAMILY MEDICINE

## 2023-02-15 PROCEDURE — 36415 COLL VENOUS BLD VENIPUNCTURE: CPT | Performed by: FAMILY MEDICINE

## 2023-02-15 PROCEDURE — 80053 COMPREHEN METABOLIC PANEL: CPT | Performed by: FAMILY MEDICINE

## 2023-02-15 PROCEDURE — 85027 COMPLETE CBC AUTOMATED: CPT | Performed by: FAMILY MEDICINE

## 2023-02-15 PROCEDURE — G0145 SCR C/V CYTO,THINLAYER,RESCR: HCPCS | Performed by: FAMILY MEDICINE

## 2023-02-15 PROCEDURE — 82607 VITAMIN B-12: CPT | Performed by: FAMILY MEDICINE

## 2023-02-15 PROCEDURE — 83036 HEMOGLOBIN GLYCOSYLATED A1C: CPT | Performed by: FAMILY MEDICINE

## 2023-02-15 PROCEDURE — 99396 PREV VISIT EST AGE 40-64: CPT | Performed by: FAMILY MEDICINE

## 2023-02-15 PROCEDURE — 84443 ASSAY THYROID STIM HORMONE: CPT | Performed by: FAMILY MEDICINE

## 2023-02-15 PROCEDURE — 80061 LIPID PANEL: CPT | Performed by: FAMILY MEDICINE

## 2023-02-15 ASSESSMENT — ENCOUNTER SYMPTOMS
FEVER: 0
HEMATOCHEZIA: 0
JOINT SWELLING: 0
NAUSEA: 1
ARTHRALGIAS: 1
BREAST MASS: 0
PARESTHESIAS: 0
WEAKNESS: 0
ABDOMINAL PAIN: 0
MYALGIAS: 0
EYE PAIN: 0
FREQUENCY: 0
DIARRHEA: 1
SHORTNESS OF BREATH: 0
PALPITATIONS: 0
SORE THROAT: 0
HEARTBURN: 0
CONSTIPATION: 0
HEMATURIA: 0
NERVOUS/ANXIOUS: 0
DYSURIA: 0
COUGH: 0
DIZZINESS: 0
CHILLS: 0
HEADACHES: 0
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
## 2023-02-15 NOTE — PATIENT INSTRUCTIONS
The vitamin supplement you brought in should be safe he does want to make sure you do not get more than 100% recommended vitamin E or typically more than 5000 units of vitamin D daily.    I am glad to see dermatology for full body skin checks with history of skin cancer.    If the numbness and tingling in your feet are getting worse I recommend a neurology consult.    The reason to go back to the spine clinic is worsening low back pain, shooting pain down your legs, weakness of your legs or any loss of bowel or bladder function.  Actually if you are having loss of bowel or bladder function that is to the emergency room.    Start 81 mg aspirin daily with some mild chronic small vessel ischemic change seen on a brain MRI in the past.    Health Maintenance   Topic Date Due    ZOSTER IMMUNIZATION (1 of 2) If you are interested in the new shingles shot, Shingrix, please check with insurance for coverage either in clinic or at a pharmacy. It is a 2 shot series 2-6 months apart.     COLORECTAL CANCER SCREENING  10/23/2022. Planned for March    ANNUAL REVIEW OF HM ORDERS  Today    YEARLY PREVENTIVE VISIT  Today    MAMMO SCREENING  06/01/2023    HPV TEST  Today    ADVANCE CARE PLANNING  Today    PAP  Today    LIPID  Today    DTAP/TDAP/TD IMMUNIZATION (3 - Td or Tdap) 07/25/2028    HEPATITIS C SCREENING  Completed    HIV SCREENING  Completed    PHQ-2 (once per calendar year)  Completed    INFLUENZA VACCINE  Completed    COVID-19 Vaccine  Completed    Pneumococcal Vaccine: Pediatrics (0 to 5 Years) and At-Risk Patients (6 to 64 Years)  Age 65    IPV IMMUNIZATION  Aged Out    MENINGITIS IMMUNIZATION  Aged Out    LUNG CANCER SCREENING  NA     DEXA scan:  Ordered

## 2023-02-16 LAB — DEPRECATED CALCIDIOL+CALCIFEROL SERPL-MC: 36 UG/L (ref 20–75)

## 2023-02-21 LAB
BKR LAB AP GYN ADEQUACY: NORMAL
BKR LAB AP GYN INTERPRETATION: NORMAL
BKR LAB AP HPV REFLEX: NORMAL
BKR LAB AP PREVIOUS ABNORMAL: NORMAL
PATH REPORT.COMMENTS IMP SPEC: NORMAL
PATH REPORT.COMMENTS IMP SPEC: NORMAL
PATH REPORT.RELEVANT HX SPEC: NORMAL

## 2023-02-23 ENCOUNTER — DOCUMENTATION ONLY (OUTPATIENT)
Dept: OTHER | Facility: CLINIC | Age: 63
End: 2023-02-23
Payer: COMMERCIAL

## 2023-02-24 LAB
HUMAN PAPILLOMA VIRUS 16 DNA: NEGATIVE
HUMAN PAPILLOMA VIRUS 18 DNA: NEGATIVE
HUMAN PAPILLOMA VIRUS FINAL DIAGNOSIS: NORMAL
HUMAN PAPILLOMA VIRUS OTHER HR: NEGATIVE

## 2023-03-29 ENCOUNTER — TRANSFERRED RECORDS (OUTPATIENT)
Dept: HEALTH INFORMATION MANAGEMENT | Facility: CLINIC | Age: 63
End: 2023-03-29
Payer: COMMERCIAL

## 2023-04-12 ENCOUNTER — TRANSFERRED RECORDS (OUTPATIENT)
Dept: HEALTH INFORMATION MANAGEMENT | Facility: CLINIC | Age: 63
End: 2023-04-12
Payer: COMMERCIAL

## 2023-04-16 DIAGNOSIS — I10 ESSENTIAL HYPERTENSION: ICD-10-CM

## 2023-04-16 RX ORDER — LISINOPRIL 5 MG/1
TABLET ORAL
Qty: 90 TABLET | Refills: 3 | Status: SHIPPED | OUTPATIENT
Start: 2023-04-16 | End: 2024-02-21

## 2023-04-16 NOTE — TELEPHONE ENCOUNTER
"Last Written Prescription Date:  5/2/2022  Last Fill Quantity: 90,  # refills: 3   Last office visit provider:  2/15/2023     Requested Prescriptions   Pending Prescriptions Disp Refills     lisinopril (ZESTRIL) 5 MG tablet [Pharmacy Med Name: LISINOPRIL 5MG TABS] 90 tablet 3     Sig: TAKE ONE TABLET BY MOUTH ONCE DAILY       ACE Inhibitors (Including Combos) Protocol Passed - 4/16/2023 12:27 PM        Passed - Blood pressure under 140/90 in past 12 months     BP Readings from Last 3 Encounters:   02/15/23 136/84   12/07/22 125/80   08/17/22 125/87                 Passed - Recent (12 mo) or future (30 days) visit within the authorizing provider's specialty     Patient has had an office visit with the authorizing provider or a provider within the authorizing providers department within the previous 12 mos or has a future within next 30 days. See \"Patient Info\" tab in inbasket, or \"Choose Columns\" in Meds & Orders section of the refill encounter.              Passed - Medication is active on med list        Passed - Patient is age 18 or older        Passed - No active pregnancy on record        Passed - Normal serum creatinine on file in past 12 months     Recent Labs   Lab Test 02/15/23  1452   CR 0.84       Ok to refill medication if creatinine is low          Passed - Normal serum potassium on file in past 12 months     Recent Labs   Lab Test 02/15/23  1452   POTASSIUM 4.5             Passed - No positive pregnancy test within past 12 months             Liliana Viramontes RN 04/16/23 12:27 PM  "

## 2023-04-19 ENCOUNTER — DOCUMENTATION ONLY (OUTPATIENT)
Dept: OTHER | Facility: CLINIC | Age: 63
End: 2023-04-19
Payer: COMMERCIAL

## 2023-04-27 DIAGNOSIS — R45.86 MOOD CHANGE: ICD-10-CM

## 2023-04-27 NOTE — TELEPHONE ENCOUNTER
Pending Prescriptions:                       Disp   Refills    citalopram (CELEXA) 20 MG tablet          90 tab*3            Sig: Take 1 tablet (20 mg) by mouth daily

## 2023-04-28 NOTE — TELEPHONE ENCOUNTER
"Routing refill request to provider for review/approval because:  A break in medication    Last Written Prescription Date:  2/9/2022  Last Fill Quantity: 90,  # refills: 3   Last office visit provider:  2/15/2023     Requested Prescriptions   Pending Prescriptions Disp Refills     citalopram (CELEXA) 20 MG tablet 90 tablet 3     Sig: Take 1 tablet (20 mg) by mouth daily       SSRIs Protocol Passed - 4/27/2023  1:49 PM        Passed - Recent (12 mo) or future (30 days) visit within the authorizing provider's specialty     Patient has had an office visit with the authorizing provider or a provider within the authorizing providers department within the previous 12 mos or has a future within next 30 days. See \"Patient Info\" tab in inbasket, or \"Choose Columns\" in Meds & Orders section of the refill encounter.              Passed - Medication is active on med list        Passed - Patient is age 18 or older        Passed - No active pregnancy on record        Passed - No positive pregnancy test in last 12 months             Grace Dixon RN 04/28/23 11:34 AM  "

## 2023-04-29 RX ORDER — CITALOPRAM HYDROBROMIDE 20 MG/1
20 TABLET ORAL DAILY
Qty: 90 TABLET | Refills: 3 | Status: SHIPPED | OUTPATIENT
Start: 2023-04-29 | End: 2023-11-29

## 2023-05-10 ENCOUNTER — TRANSFERRED RECORDS (OUTPATIENT)
Dept: HEALTH INFORMATION MANAGEMENT | Facility: CLINIC | Age: 63
End: 2023-05-10
Payer: COMMERCIAL

## 2023-05-10 ENCOUNTER — MYC MEDICAL ADVICE (OUTPATIENT)
Dept: FAMILY MEDICINE | Facility: CLINIC | Age: 63
End: 2023-05-10
Payer: COMMERCIAL

## 2023-05-10 DIAGNOSIS — Z79.899 HIGH RISK MEDICATION USE: ICD-10-CM

## 2023-05-10 RX ORDER — PHENTERMINE HYDROCHLORIDE 15 MG/1
15 CAPSULE ORAL EVERY MORNING
Qty: 30 CAPSULE | Refills: 2 | Status: SHIPPED | OUTPATIENT
Start: 2023-05-10 | End: 2023-08-16

## 2023-06-05 ENCOUNTER — HOSPITAL ENCOUNTER (OUTPATIENT)
Dept: MAMMOGRAPHY | Facility: CLINIC | Age: 63
Discharge: HOME | End: 2023-06-05
Attending: FAMILY MEDICINE | Admitting: FAMILY MEDICINE
Payer: COMMERCIAL

## 2023-06-05 DIAGNOSIS — Z12.31 VISIT FOR SCREENING MAMMOGRAM: ICD-10-CM

## 2023-06-05 PROCEDURE — 77067 SCR MAMMO BI INCL CAD: CPT

## 2023-08-16 DIAGNOSIS — Z79.899 HIGH RISK MEDICATION USE: ICD-10-CM

## 2023-08-16 RX ORDER — PHENTERMINE HYDROCHLORIDE 15 MG/1
15 CAPSULE ORAL EVERY MORNING
Qty: 30 CAPSULE | Refills: 0 | Status: SHIPPED | OUTPATIENT
Start: 2023-08-16 | End: 2023-11-29

## 2023-11-22 ASSESSMENT — SLEEP AND FATIGUE QUESTIONNAIRES
HOW LIKELY ARE YOU TO NOD OFF OR FALL ASLEEP WHILE LYING DOWN TO REST IN THE AFTERNOON WHEN CIRCUMSTANCES PERMIT: MODERATE CHANCE OF DOZING
HOW LIKELY ARE YOU TO NOD OFF OR FALL ASLEEP IN A CAR, WHILE STOPPED FOR A FEW MINUTES IN TRAFFIC: WOULD NEVER DOZE
HOW LIKELY ARE YOU TO NOD OFF OR FALL ASLEEP WHILE SITTING INACTIVE IN A PUBLIC PLACE: WOULD NEVER DOZE
HOW LIKELY ARE YOU TO NOD OFF OR FALL ASLEEP WHILE SITTING AND TALKING TO SOMEONE: WOULD NEVER DOZE
HOW LIKELY ARE YOU TO NOD OFF OR FALL ASLEEP WHEN YOU ARE A PASSENGER IN A CAR FOR AN HOUR WITHOUT A BREAK: WOULD NEVER DOZE
HOW LIKELY ARE YOU TO NOD OFF OR FALL ASLEEP WHILE SITTING QUIETLY AFTER LUNCH WITHOUT ALCOHOL: WOULD NEVER DOZE
HOW LIKELY ARE YOU TO NOD OFF OR FALL ASLEEP WHILE SITTING AND READING: SLIGHT CHANCE OF DOZING
HOW LIKELY ARE YOU TO NOD OFF OR FALL ASLEEP WHILE WATCHING TV: MODERATE CHANCE OF DOZING

## 2023-11-29 ENCOUNTER — OFFICE VISIT (OUTPATIENT)
Dept: SLEEP MEDICINE | Facility: CLINIC | Age: 63
End: 2023-11-29
Payer: COMMERCIAL

## 2023-11-29 VITALS
SYSTOLIC BLOOD PRESSURE: 126 MMHG | OXYGEN SATURATION: 98 % | DIASTOLIC BLOOD PRESSURE: 85 MMHG | HEIGHT: 65 IN | BODY MASS INDEX: 26.56 KG/M2 | RESPIRATION RATE: 18 BRPM | WEIGHT: 159.4 LBS | HEART RATE: 75 BPM

## 2023-11-29 DIAGNOSIS — R06.83 SNORING: ICD-10-CM

## 2023-11-29 DIAGNOSIS — R06.81 WITNESSED EPISODE OF APNEA: Primary | ICD-10-CM

## 2023-11-29 DIAGNOSIS — I10 ESSENTIAL HYPERTENSION: ICD-10-CM

## 2023-11-29 DIAGNOSIS — G47.10 HYPERSOMNIA: ICD-10-CM

## 2023-11-29 PROCEDURE — 99204 OFFICE O/P NEW MOD 45 MIN: CPT | Performed by: INTERNAL MEDICINE

## 2023-11-29 RX ORDER — MELATONIN 5 MG
TABLET,CHEWABLE ORAL
COMMUNITY
End: 2023-11-29

## 2023-11-29 RX ORDER — TRIAMCINOLONE ACETONIDE 1 MG/G
CREAM TOPICAL
COMMUNITY
Start: 2023-05-11 | End: 2023-11-29

## 2023-11-29 NOTE — PATIENT INSTRUCTIONS
What is a Home Sleep Study?    your doctor can give you a portable sleep monitor to use at home, so you don t have to spend the night in the sleep lab. But you should use a portable monitor only if:   ?Your doctor thinks you have a condition that makes you stop breathing for short periods while you are asleep, called  sleep apnea.    ?You do not have other serious medical problems, such as heart disease or lung disease.    Please bring the home sleep study device back to the sleep center as soon as you are finished with it so we can score it.     The cost of care estimate line is [phone]. They are able to give the patient an estimate of the charges and also an estimate of their insurance coverage/patient responsibility.   After your sleep study is performed, please call us at [phone] or [phone] to schedule for a follow up to review the results of the sleep study.    Consider using one tab of low dose melatonin 3 mg or less on the night of the study.    It is completely voluntary.    Do not drive or operate machinery after intake of melatonin.     Due to the pandemic, we have many people waiting in line for sleep studies- this wait list is improving each week.   You should receive a call within 2 weeks from our staff to schedule you for  your test.   Depending on the delay in approval by your insurance carrier, the study will be completed within a few days to 2 weeks of that call.    Please make every effort you can to sleep on your back with one pillow behind your head.    Please also call your insurance company next week to see if your sleep study is approved and find out your co-pay.

## 2023-11-29 NOTE — NURSING NOTE
HST pickup/dropoff and return visit has been schedule. AVS printed and HST Packet/letter given to patient.      Kitty Cherry MA  [facility]

## 2023-11-29 NOTE — PROGRESS NOTES
Additional 15 minutes on the date of service was spent performing the following:    -Preparing to see the patient  -Obtaining and/or reviewing separately obtained history   -Ordering medications, tests, or procedures   -Documenting clinical information in the electronic or other health record     Assessment and Plan:    In summary Ursula Pedro is a 63 year old year old female here for sleep disturbance.  1.  Witnessed Apnea/Hypersomnia/Snoring/Hypertension   Ursula Pedro has high risk for obstructive sleep apnea based on the history of witnessed apnea, hypersomnia, snoring and a crowded airway. I educated the patient on the underlying pathophysiology of obstructive sleep apnea. We reviewed the risks associated with sleep apnea, including increased cardiovascular risk and overall death. We talked about treatments briefly. I recommend getting a Home sleep study. The patient should return to the clinic to discuss results and treatment option in a patient-centered approach.    History of present illness:    She is a 63 year old female who comes to the clinic with a chief complaint of witnessed apnea and abnormal breathing going on for more than a year.  She has been told by friends and family members that she has pauses in her breathing during sleep followed by snoring and other abnormal breathing patterns.  She is also being treated for high blood pressure.     Ideal Sleep-Wake Cycle(devoid of societal pressure):    Patient would try to initiate sleep at around Midnight.Final wake up time is around 7:30-8 AM.    TIME IN BED:    1) Work/School Days:    Do you work or go to school? Yes   What time do you usually get into bed? 10:30PM   About how long does it take you to fall asleep? 15-30 minutes   How often do you have trouble falling asleep? 1   How often do you wake up during the night? 2-3   Do you work days/evenings/nights/rotating shifts? Days   What wakes you up at night? Snorting self awake    Use the  bathroom   How often do you have trouble falling back to sleep? 1   About how long does it take to fall back to sleep? 15-30 minutes   What do you usually do if you have trouble getting back to sleep? lie there   What time do you usually get out of bed to start your day? 6:00 am   Do you use an alarm? Yes   2) Weekends/Non-work Days/All Other Days    What time do you usually get into bed? 10:30-11:30pm   About how long does it take you to fall asleep? 15 minutes   What time do you usually get out of bed to start your day? 8:00   Do you use an alarm? No   SLEEP NEED    On average, about how much sleep do you think you get? 7-8 hours   About how much sleep do you think you need? 8-8.5 hours   SLEEP POSITION    Which sleep positions do you prefer? Back    Side   Do you do any of the following activities in bed? Watch TV   How often do you take a nap on purpose? 0   About how long are your naps?    Do you feel better after naps?    How often do you doze off unintentionally? 1   Have you ever had a driving accident or near-miss due to sleepiness/drowsiness? No       Stop Bang Questionnaire    Question 11/22/2023  8:11 PM CST - Filed by Patient   Do you SNORE loudly (louder than talking or loud enough to be heard through closed doors)? Yes   Do you often feel TIRED, fatigued, or sleepy during daytime? Yes   Has anyone OBSERVED you stop breathing during your sleep? Yes   Do you have or are you being treated for high blood PRESSURE? Yes   BMI more than 35kg/m2? No   AGE over 50 years old? Yes   NECK circumference > 16 inches (40cm)? No   GENDER: Male? No   STOP-BANG Total Score (range: 0 - 8) 6 (High risk of BALDO) Critical        ASHOK:  ASHOK Total Score: 13  Total score - [place]: 5 (11/22/2023  8:09 PM)     Patient told to return in one week after the sleep study is interpreted.    Patient Active Problem List   Diagnosis    Allergic Rhinitis    Oral Allergy Syndrome    Basal Cell Carcinoma Of The Skin    Adiposity     Hypertension    Female Stress Incontinence    Mammogram - Abnormal    BMI 30.0-30.9,adult    Prediabetes    Hypercholesteremia    Family history of osteoporosis    Vitamin D deficiency    Decreased sense of smell    Controlled substance agreement signed-CSA and urine tox done for Phentermine 8-17-22    Polyneuropathy    Adenomatous polyp of colon, unspecified part of colon       Past Medical History  Past Medical History:   Diagnosis Date    Breast cyst 2012    Right Breast    Skin cancer 2012    Basal Cell        Past Surgical History  Past Surgical History:   Procedure Laterality Date    BIOPSY BREAST Right 2013        Meds  Current Outpatient Medications   Medication Sig Dispense Refill    cholecalciferol, vitamin D3, (VITAMIN D3) 2,000 unit cap [CHOLECALCIFEROL, VITAMIN D3, (VITAMIN D3) 2,000 UNIT CAP] Take 2,000 Units by mouth daily.      lisinopril (ZESTRIL) 5 MG tablet TAKE ONE TABLET BY MOUTH ONCE DAILY 90 tablet 3    Probiotic Product (PROBIOTIC BLEND PO)           Allergies  Patient has no known allergies.     Social History  Social History     Socioeconomic History    Marital status:      Spouse name: Not on file    Number of children: Not on file    Years of education: Not on file    Highest education level: Not on file   Occupational History    Not on file   Tobacco Use    Smoking status: Former     Packs/day: 0     Types: Cigarettes    Smokeless tobacco: Never   Substance and Sexual Activity    Alcohol use: Yes    Drug use: Not on file    Sexual activity: Not on file   Other Topics Concern    Not on file   Social History Narrative    Not on file     Social Determinants of Health     Financial Resource Strain: Not on file   Food Insecurity: Not on file   Transportation Needs: Not on file   Physical Activity: Not on file   Stress: Not on file   Social Connections: Not on file   Interpersonal Safety: Not on file   Housing Stability: Not on file        Family History  Family History   Problem  "Relation Age of Onset    Hypertension Mother     Skin Cancer Father     Hypertension Father     Diabetes Father     Coronary Artery Disease Father     Sleep Apnea Father     Colon Cancer Brother     Liver Cancer Brother     Breast Cancer Maternal Grandmother 90    Osteoporosis Maternal Grandmother     Cerebrovascular Disease Maternal Grandmother     Coronary Artery Disease Maternal Grandfather     Pancreatic Cancer Paternal Grandmother     Diabetes Paternal Grandfather     Coronary Artery Disease Paternal Grandfather     Uterine Cancer Cousin       Review of Systems:  Constitutional: Negative except as noted in HPI.   Eyes: Negative except as noted in HPI.   ENT: Negative except as noted in HPI.   Cardiovascular: Negative except as noted in HPI.   Respiratory: Negative except as noted in HPI.   Gastrointestinal: Negative except as noted in HPI.   Genitourinary: Negative except as noted in HPI.   Musculoskeletal: Negative except as noted in HPI.   Integumentary: Negative except as noted in HPI.   Neurological: Negative except as noted in HPI.   Psychiatric: Negative except as noted in HPI.   Endocrine: Negative except as noted in HPI.   Hematologic/Lymphatic: Negative except as noted in HPI.      Physical Exam:  /85   Pulse 75   Resp 18   Ht 1.651 m (5' 5\")   Wt 72.3 kg (159 lb 6.4 oz)   SpO2 98%   BMI 26.53 kg/m    BMI:Body mass index is 26.53 kg/m .   GEN: NAD, appropriate for age  Head: Normocephalic.  EYES: PERRLA, EOMI  ENT: Oropharynx is clear, Phan class 3+ airway.   Nasal mucosa is moist without erythema  Neck : Thyroid is within normal limits.   CV: Regular rate and rhythm, S1 & S2 positive.  LUNGS: Bilateral breathsounds heard.   ABDOMEN: Positive bowel sounds in all quadrants, soft, no rebound or guarding  MUSCULOSKELETAL: No leg swelling  SKIN: warm, dry, no rashes  Neurological: Alert, Gait is normal. Strength 5/5 in all extremities.  Psych: normal mood, normal affect     Labs/Studies:   " "  No results found for: \"PH\", \"PHARTERIAL\", \"PO2\", \"PO1IIQXYQAT\", \"SAT\", \"PCO2\", \"HCO3\", \"BASEEXCESS\", \"WENDI\", \"BEB\"  Lab Results   Component Value Date    TSH 0.89 02/15/2023    TSH 0.89 02/09/2022     Lab Results   Component Value Date    GLC 95 02/15/2023     02/09/2022     Lab Results   Component Value Date    HGB 14.7 02/15/2023    HGB 14.4 02/09/2022     Lab Results   Component Value Date    BUN 15.3 02/15/2023    BUN 11 02/09/2022    CR 0.84 02/15/2023    CR 0.77 02/09/2022     Lab Results   Component Value Date    AST 25 02/15/2023    AST 17 02/09/2022    ALT 15 02/15/2023    ALT 11 02/09/2022    ALKPHOS 77 02/15/2023    ALKPHOS 70 02/09/2022    BILITOTAL 0.2 02/15/2023    BILITOTAL 0.4 02/09/2022     No results found for: \"UAMP\", \"UBARB\", \"BENZODIAZEUR\", \"UCANN\", \"UCOC\", \"OPIT\", \"UPCP\"      Patient verbalized understanding of these issues, agrees with the plan and all questions were answered today. Patient was given an opportuntity to voice any other symptoms or concerns not listed above. Patient did not have any other symptoms or concerns.         Elijah Diop DO,   Board Certified in Internal Medicine and Sleep Medicine    (Note created with Dragon voice recognition and unintended spelling errors and word substitutions may occur)     "

## 2023-12-14 ENCOUNTER — PATIENT OUTREACH (OUTPATIENT)
Dept: GASTROENTEROLOGY | Facility: CLINIC | Age: 63
End: 2023-12-14
Payer: COMMERCIAL

## 2024-01-08 ENCOUNTER — ANCILLARY PROCEDURE (OUTPATIENT)
Dept: BONE DENSITY | Facility: CLINIC | Age: 64
End: 2024-01-08
Attending: FAMILY MEDICINE
Payer: COMMERCIAL

## 2024-01-08 DIAGNOSIS — Z78.0 MENOPAUSE: ICD-10-CM

## 2024-01-08 PROCEDURE — 77080 DXA BONE DENSITY AXIAL: CPT | Mod: TC | Performed by: RADIOLOGY

## 2024-01-16 ENCOUNTER — PATIENT OUTREACH (OUTPATIENT)
Dept: CARE COORDINATION | Facility: CLINIC | Age: 64
End: 2024-01-16
Payer: COMMERCIAL

## 2024-01-18 ASSESSMENT — SLEEP AND FATIGUE QUESTIONNAIRES
HOW LIKELY ARE YOU TO NOD OFF OR FALL ASLEEP WHILE SITTING AND READING: SLIGHT CHANCE OF DOZING
HOW LIKELY ARE YOU TO NOD OFF OR FALL ASLEEP WHILE SITTING INACTIVE IN A PUBLIC PLACE: WOULD NEVER DOZE
HOW LIKELY ARE YOU TO NOD OFF OR FALL ASLEEP WHILE SITTING AND TALKING TO SOMEONE: WOULD NEVER DOZE
HOW LIKELY ARE YOU TO NOD OFF OR FALL ASLEEP WHEN YOU ARE A PASSENGER IN A CAR FOR AN HOUR WITHOUT A BREAK: WOULD NEVER DOZE
HOW LIKELY ARE YOU TO NOD OFF OR FALL ASLEEP IN A CAR, WHILE STOPPED FOR A FEW MINUTES IN TRAFFIC: WOULD NEVER DOZE
HOW LIKELY ARE YOU TO NOD OFF OR FALL ASLEEP WHILE WATCHING TV: SLIGHT CHANCE OF DOZING
HOW LIKELY ARE YOU TO NOD OFF OR FALL ASLEEP WHILE LYING DOWN TO REST IN THE AFTERNOON WHEN CIRCUMSTANCES PERMIT: SLIGHT CHANCE OF DOZING
HOW LIKELY ARE YOU TO NOD OFF OR FALL ASLEEP WHILE SITTING QUIETLY AFTER LUNCH WITHOUT ALCOHOL: SLIGHT CHANCE OF DOZING

## 2024-01-25 ENCOUNTER — OFFICE VISIT (OUTPATIENT)
Dept: SLEEP MEDICINE | Facility: CLINIC | Age: 64
End: 2024-01-25
Attending: INTERNAL MEDICINE
Payer: COMMERCIAL

## 2024-01-25 VITALS — WEIGHT: 159 LBS | HEIGHT: 65 IN | BODY MASS INDEX: 26.49 KG/M2

## 2024-01-25 DIAGNOSIS — G47.10 HYPERSOMNIA: ICD-10-CM

## 2024-01-25 DIAGNOSIS — R06.81 WITNESSED EPISODE OF APNEA: ICD-10-CM

## 2024-01-25 DIAGNOSIS — R06.83 SNORING: ICD-10-CM

## 2024-01-25 DIAGNOSIS — I10 ESSENTIAL HYPERTENSION: ICD-10-CM

## 2024-01-25 PROCEDURE — G0399 HOME SLEEP TEST/TYPE 3 PORTA: HCPCS | Performed by: INTERNAL MEDICINE

## 2024-01-25 NOTE — PROGRESS NOTES
Pt is completing a home sleep test. Pt was instructed on how to put on the Noxturnal T3 device and associated equipment before going to bed and given the opportunity to practice putting it on before leaving the sleep center. Pt was reminded to bring the home sleep test kit back to the center tomorrow, at agreed upon time for download and reporting.   Neck circumference: 37 CM / 14.5 inches.   Janett Pritchett MA

## 2024-01-26 ENCOUNTER — DOCUMENTATION ONLY (OUTPATIENT)
Dept: SLEEP MEDICINE | Facility: CLINIC | Age: 64
End: 2024-01-26
Payer: COMMERCIAL

## 2024-01-30 ENCOUNTER — PATIENT OUTREACH (OUTPATIENT)
Dept: CARE COORDINATION | Facility: CLINIC | Age: 64
End: 2024-01-30
Payer: COMMERCIAL

## 2024-01-30 NOTE — PROGRESS NOTES
HST POST-STUDY QUESTIONNAIRE    What time did you go to bed?  10:45pm  How long do you think it took to fall asleep?  15-25 min  What time did you wake up to start the day?  4:45am  Did you get up during the night at all?  yes  If you woke up, do you remember approximately what time(s)? 11:30pm   Did you have any difficulty with the equipment?  No  Did you us any type of treatment with this study?  None  Was the head of the bed elevated? No  Did you sleep in a recliner?  No  Did you stop using CPAP at least 3 days before this test?  NA  Any other information you'd like us to know? I did not sleep well. Had hot flash & couldn't fall back to sleep easily.    Janett Pritchett MA

## 2024-01-30 NOTE — PROGRESS NOTES
Pt returned HST device. It was downloaded and forwarded data to the clinical specialist for scoring.     Janett Pritchett MA

## 2024-01-30 NOTE — PROGRESS NOTES
This HSAT was performed using a Noxturnal T3 device which recorded snore, sound, movement activity, body position, nasal pressure, oronasal thermal airflow, pulse, oximetry and both chest and abdominal respiratory effort. HSAT data was restricted to the time patient states they were in bed.     HSAT was scored using 1B 4% hypopnea rule.     HST AHI (Non-PAT): 23.9  Snoring was reported as mild.  Time with SpO2 below 89% was 10 minutes.   Overall signal quality was good     Pt will follow up with sleep provider to determine appropriate therapy.

## 2024-01-31 NOTE — PROCEDURES
"HOME SLEEP STUDY INTERPRETATION    Patient: Ursula Pedro  MRN: [id number]  YOB: 1960  Study Date: 1/25/2024  Referring Provider: Jaim Mcgarry MD  Ordering Provider: Elijah Diop DO     Indications for Home Study: Ursula Pedro is a 63 year old female who presents with symptoms suggestive of obstructive sleep apnea.    Estimated body mass index is 26.46 kg/m  as calculated from the following:    Height as of this encounter: 1.651 m (5' 5\").    Weight as of this encounter: 72.1 kg (159 lb).  Total score - [place]: 4 (1/18/2024  7:00 PM)  Total Score: 5 (1/18/2024  7:01 PM)    Data: A full night home sleep study was performed recording the standard physiologic parameters including body position, movement, sound, nasal pressure, thermal oral airflow, chest and abdominal movements with respiratory inductance plethysmography, and oxygen saturation by pulse oximetry. Pulse rate was estimated by oximetry recording. This study was considered adequate based on > 4 hours of quality oximetry and respiratory recording. As specified by the AASM Manual for the Scoring of Sleep and Associated events, version 2.3, Rule VIII.D 1B, 4% oxygen desaturation scoring for hypopneas is used as a standard of care on all home sleep apnea testing.    Analysis Time:  343.4 minutes    Respiration:   Sleep Associated Hypoxemia: sustained hypoxemia was present. Baseline oxygen saturation was 94%.  Time with saturation less than or equal to 88% was 6.2 minutes. The lowest oxygen saturation was 78%.   Snoring: Snoring was present.  Respiratory events: The home study revealed a presence of 47 obstructive apneas and 1 mixed and central apneas. There were 88 hypopneas resulting in a combined apnea/hypopnea index [AHI] of 23.9 events per hour.  AHI was 43.3 per hour supine, N/A per hour prone, 1.5 per hour on left side, and 20.5 per hour on right side.   Pattern: Excluding events noted above, respiratory rate and pattern " was Normal.    Position: Percent of time spent: supine - 34.3%, prone - 0%, on left - 23.5%, on right - 41.8%.    Heart Rate: By pulse oximetry normal rate was noted.     Assessment:   Moderate obstructive sleep apnea.  Sleep associated hypoxemia was present.    Recommendations:  Consider auto-CPAP at 5-15 cmH2O, oral appliance therapy, positional therapy, or polysomnography with full night PAP titration.  Suggest optimizing sleep hygiene and avoiding sleep deprivation.  Weight management.    Diagnosis Code(s): Obstructive Sleep Apnea G47.33, Hypoxemia G47.36    Elijah Diop DO, January 31, 2024   Diplomate, American Board of Internal Medicine, Sleep Medicine

## 2024-02-13 DIAGNOSIS — G47.33 OSA (OBSTRUCTIVE SLEEP APNEA): Primary | ICD-10-CM

## 2024-02-14 SDOH — HEALTH STABILITY: PHYSICAL HEALTH: ON AVERAGE, HOW MANY MINUTES DO YOU ENGAGE IN EXERCISE AT THIS LEVEL?: 60 MIN

## 2024-02-14 SDOH — HEALTH STABILITY: PHYSICAL HEALTH: ON AVERAGE, HOW MANY DAYS PER WEEK DO YOU ENGAGE IN MODERATE TO STRENUOUS EXERCISE (LIKE A BRISK WALK)?: 3 DAYS

## 2024-02-14 ASSESSMENT — SOCIAL DETERMINANTS OF HEALTH (SDOH): HOW OFTEN DO YOU GET TOGETHER WITH FRIENDS OR RELATIVES?: ONCE A WEEK

## 2024-02-21 ENCOUNTER — DOCUMENTATION ONLY (OUTPATIENT)
Dept: SLEEP MEDICINE | Facility: CLINIC | Age: 64
End: 2024-02-21
Payer: COMMERCIAL

## 2024-02-21 ENCOUNTER — OFFICE VISIT (OUTPATIENT)
Dept: FAMILY MEDICINE | Facility: CLINIC | Age: 64
End: 2024-02-21
Payer: COMMERCIAL

## 2024-02-21 VITALS
DIASTOLIC BLOOD PRESSURE: 75 MMHG | RESPIRATION RATE: 18 BRPM | SYSTOLIC BLOOD PRESSURE: 119 MMHG | OXYGEN SATURATION: 96 % | TEMPERATURE: 98.2 F | BODY MASS INDEX: 27.83 KG/M2 | WEIGHT: 163 LBS | HEIGHT: 64 IN | HEART RATE: 81 BPM

## 2024-02-21 DIAGNOSIS — L98.9 SKIN LESION: ICD-10-CM

## 2024-02-21 DIAGNOSIS — R21 RASH: ICD-10-CM

## 2024-02-21 DIAGNOSIS — G62.9 POLYNEUROPATHY: ICD-10-CM

## 2024-02-21 DIAGNOSIS — Z00.00 ENCOUNTER FOR PREVENTATIVE ADULT HEALTH CARE EXAMINATION: Primary | ICD-10-CM

## 2024-02-21 DIAGNOSIS — E55.9 VITAMIN D DEFICIENCY: ICD-10-CM

## 2024-02-21 DIAGNOSIS — G47.33 OBSTRUCTIVE SLEEP APNEA (ADULT) (PEDIATRIC): Primary | ICD-10-CM

## 2024-02-21 DIAGNOSIS — G47.39 OTHER SLEEP APNEA: ICD-10-CM

## 2024-02-21 DIAGNOSIS — R73.03 PREDIABETES: ICD-10-CM

## 2024-02-21 DIAGNOSIS — E78.00 HYPERCHOLESTEREMIA: ICD-10-CM

## 2024-02-21 DIAGNOSIS — C44.91 BASAL CELL CARCINOMA (BCC), UNSPECIFIED SITE: ICD-10-CM

## 2024-02-21 DIAGNOSIS — I10 ESSENTIAL HYPERTENSION: ICD-10-CM

## 2024-02-21 PROBLEM — Z79.899 CONTROLLED SUBSTANCE AGREEMENT SIGNED: Status: RESOLVED | Noted: 2022-08-17 | Resolved: 2024-02-21

## 2024-02-21 PROCEDURE — 99396 PREV VISIT EST AGE 40-64: CPT | Performed by: FAMILY MEDICINE

## 2024-02-21 PROCEDURE — 99213 OFFICE O/P EST LOW 20 MIN: CPT | Mod: 25 | Performed by: FAMILY MEDICINE

## 2024-02-21 RX ORDER — TRIAMCINOLONE ACETONIDE 1 MG/G
CREAM TOPICAL 2 TIMES DAILY
COMMUNITY
End: 2024-09-25

## 2024-02-21 RX ORDER — LISINOPRIL 5 MG/1
TABLET ORAL
Qty: 90 TABLET | Refills: 3 | Status: SHIPPED | OUTPATIENT
Start: 2024-02-21

## 2024-02-21 NOTE — PROGRESS NOTES
Patient was offered choice of vendor and chose [facility].  Patient Ursula Pedro was set up at [place] on February 21, 2024. Patient received a [employer]med Airsense 10 Pressures were set at  6-18 cm H2O.   Patient s ramp is 5 cm H2O for Auto and FLEX/EPR is EPR, 2.  Patient received a [employer] Mask name: Eson 2 Nasal mask size Medium, heated tubing and heated humidifier.  Patient has the following compliance requirements: none.  Patient has a follow up on 4/17/24 with Terri Blanco CNP.    ARACELIS DOBSON

## 2024-02-21 NOTE — PROGRESS NOTES
"Preventive Care Visit  [facility]  Jami Mcgarry MD, Family Medicine  Feb 21, 2024    Assessment & Plan       ICD-10-CM    1. Encounter for preventative adult health care examination  Z00.00       2. Essential hypertension  I10 lisinopril (ZESTRIL) 5 MG tablet     Comprehensive metabolic panel (BMP + Alb, Alk Phos, ALT, AST, Total. Bili, TP)     CBC with platelets      3. Other sleep apnea  G47.39       4. Vitamin D deficiency  E55.9 Vitamin D Deficiency      5. Hypercholesteremia  E78.00 Lipid panel reflex to direct LDL Fasting      6. Prediabetes  R73.03       7. Basal cell carcinoma (BCC), unspecified site  C44.91       8. Polyneuropathy  G62.9 Hemoglobin A1c     Vitamin B12     TSH with free T4 reflex        If you did not get the RSV vaccine with your study you can check on insurance for coverage either at a clinic or pharmacy.    If you are interested in the new shingles shot, Shingrix, please check with insurance for coverage either in clinic or at a pharmacy. It is a 2 shot series 2-6 months apart.     COVID shot when you are able.  I would separate the COVID shot and the shingles shot by 2 weeks.    Can set fasting lab apt at work.    The rash on your right forearm looks like an eczema or dermatitis.  You can use the triamcinolone 0.1% twice a day as needed.  If you need the stronger version at 0.5% let us know.    There is a mole on your low back that does have some irregular borders and irregular color, I recommend seeing dermatology for that.    Also, I am glad you see [facility] yearly for full body skin check with history of skin cancer.    Patient has been advised of split billing requirements and indicates understanding: Yes          BMI  Estimated body mass index is 27.83 kg/m  as calculated from the following:    Height as of this encounter: 1.63 m (5' 4.17\").    Weight as of this encounter: 73.9 kg (163 lb).   Weight management plan: Discussed healthy diet and " exercise guidelines    Counseling  Appropriate preventive services were discussed with this patient, including applicable screening as appropriate for fall prevention, nutrition, physical activity, Tobacco-use cessation, weight loss and cognition.  Checklist reviewing preventive services available has been given to the patient.  Reviewed patient's diet, addressing concerns and/or questions.   She is at risk for lack of exercise and has been provided with information to increase physical activity for the benefit of her well-being.           Sue Vergara is a 63 year old, presenting for the following:  Physical        2/21/2024     2:03 PM   Additional Questions   Roomed by Nanci GUZMAN CMA        Health Care Directive  Patient does not have a Health Care Directive or Living Will: Discussed advance care planning with patient; information given to patient to review.    HPI            In study for RSV shot, blinded study, about a year and a half ago.  She does not know if she is gotten the RSV shot but the study will be ending soon.    Sleep apnea:  Had a sleep study and has moderate sleep apnea.  Picked up CPAP today and to start using.  The workup was started after a girls weekend and loud noises at night and pauses in her breathing.    Rash on forearm for months, thinks eczema.   Using amcinonide 0.1% and is helping some.  Sees Derm for skin checks, history of skin cancer.    Neuropathy:  Starts mid cameron and goes to feet.  Same for 10 years. Runs in her family.  Father I diabetic.  Mom is super healthy.    Skin lesion: The last 2 months she has noticed a dark lesion on the low back that is rough.  It will bleed at times.  She does have a history of skin cancer.    Social:  Jaya johnson and her father in [place] and his health is failing.  Step mother care for him theres.  They [place] and now sold it.  [place] is their home now.      Health Maintenance   Topic Date Due    ZOSTER IMMUNIZATION (1 of 2) If you are  interested in the new shingles shot, Shingrix, please check with insurance for coverage either in clinic or at a pharmacy. It is a 2 shot series 2-6 months apart.     RSV VACCINE (Pregnancy & 60+) (1 - 1-dose 60+ series) Discussed    COVID-19 Vaccine (6 - 2023-24 season) Will get later    ANNUAL REVIEW OF HM ORDERS  Today    YEARLY PREVENTIVE VISIT  Today    MAMMO SCREENING  06/05/2024    HPV TEST  02/15/2026    PAP  02/15/2026    GLUCOSE  Ordered    LIPID  Ordered    COLORECTAL CANCER SCREENING  03/29/2028    ADVANCE CARE PLANNING  Pending    DTAP/TDAP/TD IMMUNIZATION (3 - Td or Tdap) 07/25/2028    HEPATITIS C SCREENING  Completed    HIV SCREENING  Completed    PHQ-2 (once per calendar year)  Completed    INFLUENZA VACCINE  Completed    Pneumococcal Vaccine: Pediatrics (0 to 5 Years) and At-Risk Patients (6 to 64 Years)  Age 65    IPV IMMUNIZATION  Aged Out    HPV IMMUNIZATION  Aged Out    MENINGITIS IMMUNIZATION  Aged Out    RSV MONOCLONAL ANTIBODY  Aged Out    LUNG CANCER SCREENING  NA, quit in teens     Dexa scan:  Due [id number] 2/14/2024   General Health   How would you rate your overall physical health? Good   Feel stress (tense, anxious, or unable to sleep) To some extent   (!) STRESS CONCERN      2/14/2024   Nutrition   Three or more servings of calcium each day? (!) NO   Diet: Regular (no restrictions)   How many servings of fruit and vegetables per day? (!) 2-3   How many sweetened beverages each day? 0-1         2/14/2024   Exercise   Days per week of moderate/strenous exercise 3 days   Average minutes spent exercising at this level 60 min         2/14/2024   Social Factors   Frequency of gathering with friends or relatives Once a week   Worry food won't last until get money to buy more No   Food not last or not have enough money for food? No   Do you have housing?  Yes   Are you worried about losing your housing? No   Lack of transportation? No   Unable to get utilities (heat,electricity)? No          2/14/2024   Fall Risk   Fallen 2 or more times in the past year? No   Trouble with walking or balance? No          2/14/2024   Dental   Dentist two times every year? Yes         2/14/2024   TB Screening   Were you born outside of [place]?  No         Today's PHQ-2 Score:       2/20/2024     6:48 PM   PHQ-2 ( 1999 Pf[employer])   Q1: Little interest or pleasure in doing things 0   Q2: Feeling down, depressed or hopeless 0   PHQ-2 Score 0   Q1: Little interest or pleasure in doing things Not at all   Q2: Feeling down, depressed or hopeless Not at all   PHQ-2 Score 0           2/14/2024   Substance Use   Alcohol more than 3/day or more than 7/wk No   Do you use any other substances recreationally? No     Social History     Tobacco Use    Smoking status: Former     Packs/day: 0     Types: Cigarettes    Smokeless tobacco: Never   Substance Use Topics    Alcohol use: Yes           2/8/2023   LAST FHS-7 RESULTS   1st degree relative breast or ovarian cancer No   Any relative bilateral breast cancer No   Any male have breast cancer No   Any woman have breast and ovarian cancer Yes   Any woman with breast cancer before 50yrs No   2 or more relatives with breast and/or ovarian cancer No   2 or more relatives with breast and/or bowel cancer Yes        Mammogram Screening - Mammogram every 1-2 years updated in Health Maintenance based on mutual decision making        2/14/2024   STI Screening   New sexual partner(s) since last STI/HIV test? No     History of abnormal Pap smear: NO - age 30- 65 PAP every 3 years recommended        Latest Ref Rng & Units 2/15/2023     2:05 PM 11/11/2020    11:48 AM 7/25/2018    12:57 PM   PAP / HPV   PAP  Negative for Intraepithelial Lesion or Malignancy (NILM)  Negative for squamous intraepithelial lesion or malignancy  Electronically signed by Shala Taveras CT (ASCP) on 11/19/2020 at  9:44 AM    Negative for squamous intraepithelial lesion or malignancy  Electronically signed by Nitin  "LU Sousa (ASCP) on 8/1/2018 at  2:53 PM      HPV 16 DNA Negative Negative  Negative  Negative    HPV 18 DNA Negative Negative  Negative  Negative    Other HR HPV Negative Negative  Negative  Negative      The 10-year ASCVD risk score (Daniela TREVIÑO, et al., 2019) is: 6%    Values used to calculate the score:      Age: 63 years      Sex: Female      Is Non- : No      Diabetic: No      Tobacco smoker: No      Systolic Blood Pressure: 133 mmHg      Is BP treated: Yes      HDL Cholesterol: 51 mg/dL      Total Cholesterol: 225 mg/dL           Reviewed and updated as needed this visit by Provider                             Objective    Exam  /82 (BP Location: Left arm, Patient Position: Sitting, Cuff Size: Adult Regular)   Pulse 91   Temp 98.2  F (36.8  C) (Oral)   Resp 18   Ht 1.63 m (5' 4.17\")   Wt 73.9 kg (163 lb)   SpO2 96%   BMI 27.83 kg/m     Estimated body mass index is 27.83 kg/m  as calculated from the following:    Height as of this encounter: 1.63 m (5' 4.17\").    Weight as of this encounter: 73.9 kg (163 lb).    Physical Exam  GENERAL: alert and no distress  EYES: Eyes grossly normal to inspection, PERRL and conjunctivae and sclerae normal  HENT: ear canals and TM's normal, nose and mouth without ulcers or lesions  NECK: no adenopathy, no asymmetry, masses, or scars  RESP: lungs clear to auscultation - no rales, rhonchi or wheezes  BREAST: normal without masses, tenderness or nipple discharge and no palpable axillary masses or adenopathy  CV: regular rate and rhythm, normal S1 S2, no S3 or S4, no murmur, click or rub, no peripheral edema  ABDOMEN: soft, nontender, no hepatosplenomegaly, no masses and bowel sounds normal  MS: no gross musculoskeletal defects noted, no edema  SKIN: He does have a dark mole on the low back that does have irregular border and irregular color approximately 6 to 7 mm, red papular rash on the right forearm and near the left elbow, full-body skin " exam not performed  NEURO: Normal strength and tone, mentation intact and speech normal  PSYCH: mentation appears normal, affect normal/bright      Signed Electronically by: Jami Mcgarry MD

## 2024-02-21 NOTE — PATIENT INSTRUCTIONS
If you did not get the RSV vaccine with your study you can check on insurance for coverage either at a clinic or pharmacy.    If you are interested in the new shingles shot, Shingrix, please check with insurance for coverage either in clinic or at a pharmacy. It is a 2 shot series 2-6 months apart.     COVID shot when you are able.  I would separate the COVID shot and the shingles shot by 2 weeks.    Can set fasting lab apt at work.    The rash on your right forearm looks like an eczema or dermatitis.  You can use the triamcinolone 0.1% twice a day as needed.  If you need the stronger version at 0.5% let us know.    There is a mole on your low back that does have some irregular borders and irregular color, I recommend seeing dermatology for that.    Also, I am glad you see [facility] yearly for full body skin check with history of skin cancer.    Health Maintenance   Topic Date Due    ZOSTER IMMUNIZATION (1 of 2) If you are interested in the new shingles shot, Shingrix, please check with insurance for coverage either in clinic or at a pharmacy. It is a 2 shot series 2-6 months apart.     RSV VACCINE (Pregnancy & 60+) (1 - 1-dose 60+ series) Discussed    COVID-19 Vaccine (6 - 2023-24 season) Will get later    ANNUAL REVIEW OF HM ORDERS  Today    YEARLY PREVENTIVE VISIT  Today    MAMMO SCREENING  06/05/2024    HPV TEST  02/15/2026    PAP  02/15/2026    GLUCOSE  Ordered    LIPID  Ordered    COLORECTAL CANCER SCREENING  03/29/2028    ADVANCE CARE PLANNING  Pending    DTAP/TDAP/TD IMMUNIZATION (3 - Td or Tdap) 07/25/2028    HEPATITIS C SCREENING  Completed    HIV SCREENING  Completed    PHQ-2 (once per calendar year)  Completed    INFLUENZA VACCINE  Completed    Pneumococcal Vaccine: Pediatrics (0 to 5 Years) and At-Risk Patients (6 to 64 Years)  Age 65    IPV IMMUNIZATION  Aged Out    HPV IMMUNIZATION  Aged Out    MENINGITIS IMMUNIZATION  Aged Out    RSV MONOCLONAL ANTIBODY  Aged Out    LUNG CANCER SCREENING  NA, quit in  teens     Dexa scan:  Due 8535-1958

## 2024-02-26 ENCOUNTER — DOCUMENTATION ONLY (OUTPATIENT)
Dept: SLEEP MEDICINE | Facility: CLINIC | Age: 64
End: 2024-02-26
Payer: COMMERCIAL

## 2024-02-26 ENCOUNTER — LAB (OUTPATIENT)
Dept: LAB | Facility: CLINIC | Age: 64
End: 2024-02-26
Payer: COMMERCIAL

## 2024-02-26 DIAGNOSIS — E55.9 VITAMIN D DEFICIENCY: ICD-10-CM

## 2024-02-26 DIAGNOSIS — G62.9 POLYNEUROPATHY: ICD-10-CM

## 2024-02-26 DIAGNOSIS — E78.00 HYPERCHOLESTEREMIA: ICD-10-CM

## 2024-02-26 DIAGNOSIS — I10 ESSENTIAL HYPERTENSION: ICD-10-CM

## 2024-02-26 LAB
ALBUMIN SERPL BCG-MCNC: 4.4 G/DL (ref 3.5–5.2)
ALP SERPL-CCNC: 90 U/L (ref 40–150)
ALT SERPL W P-5'-P-CCNC: 24 U/L (ref 0–50)
ANION GAP SERPL CALCULATED.3IONS-SCNC: 11 MMOL/L (ref 7–15)
AST SERPL W P-5'-P-CCNC: 32 U/L (ref 0–45)
BILIRUB SERPL-MCNC: 0.4 MG/DL
BUN SERPL-MCNC: 12.7 MG/DL (ref 8–23)
CALCIUM SERPL-MCNC: 9.7 MG/DL (ref 8.8–10.2)
CHLORIDE SERPL-SCNC: 103 MMOL/L (ref 98–107)
CHOLEST SERPL-MCNC: 260 MG/DL
CREAT SERPL-MCNC: 0.86 MG/DL (ref 0.51–0.95)
DEPRECATED HCO3 PLAS-SCNC: 25 MMOL/L (ref 22–29)
EGFRCR SERPLBLD CKD-EPI 2021: 76 ML/MIN/1.73M2
ERYTHROCYTE [DISTWIDTH] IN BLOOD BY AUTOMATED COUNT: 11.7 % (ref 10–15)
FASTING STATUS PATIENT QL REPORTED: YES
GLUCOSE SERPL-MCNC: 112 MG/DL (ref 70–99)
HBA1C MFR BLD: 6.2 % (ref 0–5.6)
HCT VFR BLD AUTO: 44.3 % (ref 35–47)
HDLC SERPL-MCNC: 60 MG/DL
HGB BLD-MCNC: 14.7 G/DL (ref 11.7–15.7)
LDLC SERPL CALC-MCNC: 179 MG/DL
MCH RBC QN AUTO: 28.8 PG (ref 26.5–33)
MCHC RBC AUTO-ENTMCNC: 33.2 G/DL (ref 31.5–36.5)
MCV RBC AUTO: 87 FL (ref 78–100)
NONHDLC SERPL-MCNC: 200 MG/DL
PLATELET # BLD AUTO: 300 10E3/UL (ref 150–450)
POTASSIUM SERPL-SCNC: 4.2 MMOL/L (ref 3.4–5.3)
PROT SERPL-MCNC: 7.8 G/DL (ref 6.4–8.3)
RBC # BLD AUTO: 5.1 10E6/UL (ref 3.8–5.2)
SODIUM SERPL-SCNC: 139 MMOL/L (ref 135–145)
TRIGL SERPL-MCNC: 106 MG/DL
TSH SERPL DL<=0.005 MIU/L-ACNC: 1.63 UIU/ML (ref 0.3–4.2)
VIT B12 SERPL-MCNC: 1351 PG/ML (ref 232–1245)
VIT D+METAB SERPL-MCNC: 37 NG/ML (ref 20–50)
WBC # BLD AUTO: 6.7 10E3/UL (ref 4–11)

## 2024-02-26 PROCEDURE — 85027 COMPLETE CBC AUTOMATED: CPT

## 2024-02-26 PROCEDURE — 83036 HEMOGLOBIN GLYCOSYLATED A1C: CPT

## 2024-02-26 PROCEDURE — 36415 COLL VENOUS BLD VENIPUNCTURE: CPT

## 2024-02-26 PROCEDURE — 80053 COMPREHEN METABOLIC PANEL: CPT

## 2024-02-26 PROCEDURE — 80061 LIPID PANEL: CPT

## 2024-02-26 PROCEDURE — 82607 VITAMIN B-12: CPT

## 2024-02-26 PROCEDURE — 84443 ASSAY THYROID STIM HORMONE: CPT

## 2024-02-26 PROCEDURE — 82306 VITAMIN D 25 HYDROXY: CPT

## 2024-02-26 NOTE — PROGRESS NOTES
3 day Sleep therapy management telephone visit    Diagnostic AHI:  23.9 HST    Confirmed with patient at time of call- N/A Patient is still interested in STM service       Message left for patient to return call    Order settings:  CPAP MIN CPAP MAX   6 cm H2O 18 cm H2O       Device settings:  CPAP MIN CPAP MAX EPR RESMED SOFT RESPONSE SETTING   6.0 cm  H20 18.0 cm  H20 TWO OFF       Compliance 80 %    Assessment: Nighty usage most nights over four hours     Patient has the following upcoming sleep appts:  Future Sleep Appointments         Provider Department    4/17/2024 10:00 AM (Arrive by 9:45 AM) Terri Blanco APRN [facility]            Replacement device: No  STM ordered by provider: Yes     Total time spent on accessing and  interpreting remote patient PAP therapy data  10 minutes    Total time spent counseling, coaching  and reviewing PAP therapy data with patient  1 minutes    [id number] no

## 2024-02-28 ENCOUNTER — MYC MEDICAL ADVICE (OUTPATIENT)
Dept: FAMILY MEDICINE | Facility: CLINIC | Age: 64
End: 2024-02-28
Payer: COMMERCIAL

## 2024-02-28 DIAGNOSIS — E78.00 HYPERCHOLESTEREMIA: Primary | ICD-10-CM

## 2024-02-28 RX ORDER — ROSUVASTATIN CALCIUM 10 MG/1
10 TABLET, COATED ORAL DAILY
Qty: 90 TABLET | Refills: 3 | Status: SHIPPED | OUTPATIENT
Start: 2024-02-28

## 2024-03-07 ENCOUNTER — DOCUMENTATION ONLY (OUTPATIENT)
Dept: SLEEP MEDICINE | Facility: CLINIC | Age: 64
End: 2024-03-07
Payer: COMMERCIAL

## 2024-03-07 NOTE — PROGRESS NOTES
14  DAY STM VISIT    Diagnostic AHI: 23.9 HST    Subjective measures: Pt reports CPAP is going ok. She had a cold yesterday and did not use it. She has trouble with her Eson Mask as she is unable to scratch her nose or find a place for her hair. Discussed hair management and considering a nasal pillow mask or nasal cradle.  Pt was given my contact information and instructed to reach out with any questions or concerns.       Assessment: Pt not meeting objective benchmarks for leak Patient failing following subjective benchmarks: mask discomfort     Action plan: pt to have 30 day STM visit.      Device type: Auto-CPAP    PAP settings:  DEVICE TYPE CPAP MIN CPAP MAX 95TH % PRESSURE EPR MASK DISPENSED   Auto-CPAP    6.0 cm  H20 18.0 cm  H20 10.4 cm  H20  TWO Nasal Mask     Mask type:  Nasal Mask    Objective measures: 14 day rolling measures   COMPLIANCE LEAK AHI AVERAGE USE IN MINUTES   85 % 24.26 0.49 327   GOAL >70% GOAL < 24 LPM GOAL <5 GOAL >240      Patient has the following upcoming sleep appts:  Future Sleep Appointments         Provider Department    4/17/2024 10:00 AM (Arrive by 9:45 AM) Terri Blanco APRN [facility]            Total time spent on accessing and interpreting remote patient PAP therapy data  10 minutes    Total time spent counseling, coaching  and reviewing PAP therapy data with patient  7 minutes    [id number]  [id number]  no (3 day STM)

## 2024-03-28 ENCOUNTER — DOCUMENTATION ONLY (OUTPATIENT)
Dept: SLEEP MEDICINE | Facility: CLINIC | Age: 64
End: 2024-03-28
Payer: COMMERCIAL

## 2024-03-28 NOTE — PROGRESS NOTES
30 DAY STM VISIT    Diagnostic AHI:  23.9 HST    Data only recheck     Assessment: Pt meeting objective benchmarks.     Action plan: pt to have 6 month STM visit  Patient has scheduled a follow up visit with Terri Blanco CNP on 4/17/2024.   Device type: Auto-CPAP  PAP settings: CPAP min 6.0 cm  H20     CPAP max 18.0 cm  H20    95th% pressure 10.3 cm  H20      RESMED EPR level Setting: TWO    RESMED Soft response setting:  OFF  Mask type:  Nasal Mask  Objective measures: 14 day rolling measures      Compliance  64 %      Leak  22.37 lpm  last  upload      AHI 0.52   last  upload      Average number of minutes 288      Objective measure goal  Compliance   Goal >70%  Leak   Goal < 24 lpm  AHI  Goal < 5  Usage  Goal >240        Total time spent on accessing and interpreting remote patient PAP therapy data  10 minutes    Total time spent counseling, coaching  and reviewing PAP therapy data with patient  0 minutes     [id number] this call  [id number] no  at 3 or 14 day [facility]

## 2024-04-10 ENCOUNTER — E-VISIT (OUTPATIENT)
Dept: FAMILY MEDICINE | Facility: CLINIC | Age: 64
End: 2024-04-10
Payer: COMMERCIAL

## 2024-04-10 DIAGNOSIS — R45.86 MOOD CHANGE: Primary | ICD-10-CM

## 2024-04-10 PROCEDURE — 99421 OL DIG E/M SVC 5-10 MIN: CPT | Performed by: FAMILY MEDICINE

## 2024-04-10 RX ORDER — CITALOPRAM HYDROBROMIDE 20 MG/1
20 TABLET ORAL DAILY
Qty: 90 TABLET | Refills: 3 | Status: SHIPPED | OUTPATIENT
Start: 2024-04-10

## 2024-04-10 ASSESSMENT — SLEEP AND FATIGUE QUESTIONNAIRES
HOW LIKELY ARE YOU TO NOD OFF OR FALL ASLEEP WHILE SITTING INACTIVE IN A PUBLIC PLACE: WOULD NEVER DOZE
HOW LIKELY ARE YOU TO NOD OFF OR FALL ASLEEP WHEN YOU ARE A PASSENGER IN A CAR FOR AN HOUR WITHOUT A BREAK: WOULD NEVER DOZE
HOW LIKELY ARE YOU TO NOD OFF OR FALL ASLEEP WHILE WATCHING TV: WOULD NEVER DOZE
HOW LIKELY ARE YOU TO NOD OFF OR FALL ASLEEP WHILE SITTING QUIETLY AFTER LUNCH WITHOUT ALCOHOL: WOULD NEVER DOZE
HOW LIKELY ARE YOU TO NOD OFF OR FALL ASLEEP WHILE SITTING AND TALKING TO SOMEONE: WOULD NEVER DOZE
HOW LIKELY ARE YOU TO NOD OFF OR FALL ASLEEP WHILE SITTING AND READING: SLIGHT CHANCE OF DOZING
HOW LIKELY ARE YOU TO NOD OFF OR FALL ASLEEP WHILE LYING DOWN TO REST IN THE AFTERNOON WHEN CIRCUMSTANCES PERMIT: SLIGHT CHANCE OF DOZING
HOW LIKELY ARE YOU TO NOD OFF OR FALL ASLEEP IN A CAR, WHILE STOPPED FOR A FEW MINUTES IN TRAFFIC: WOULD NEVER DOZE

## 2024-04-10 NOTE — PATIENT INSTRUCTIONS
Thank you for choosing us for your care. I have placed an order for a prescription so that you can start treatment. View your full visit summary for details by clicking on the link below. Your pharmacist will able to address any questions you may have about the medication.     If you're not feeling better within 5-7 days, please schedule an appointment.  You can schedule an appointment right here in [facility], [place], or call [phone]  If the visit is for the same symptoms as your eVisit, we'll refund the cost of your eVisit if seen within seven days.

## 2024-04-17 ENCOUNTER — OFFICE VISIT (OUTPATIENT)
Dept: SLEEP MEDICINE | Facility: CLINIC | Age: 64
End: 2024-04-17
Payer: COMMERCIAL

## 2024-04-17 VITALS
WEIGHT: 159 LBS | HEART RATE: 87 BPM | BODY MASS INDEX: 27.15 KG/M2 | SYSTOLIC BLOOD PRESSURE: 126 MMHG | OXYGEN SATURATION: 97 % | DIASTOLIC BLOOD PRESSURE: 83 MMHG

## 2024-04-17 DIAGNOSIS — I10 ESSENTIAL HYPERTENSION: ICD-10-CM

## 2024-04-17 DIAGNOSIS — R73.03 PREDIABETES: ICD-10-CM

## 2024-04-17 DIAGNOSIS — G47.33 OSA ON CPAP: Primary | ICD-10-CM

## 2024-04-17 DIAGNOSIS — G47.33 OSA (OBSTRUCTIVE SLEEP APNEA): ICD-10-CM

## 2024-04-17 PROBLEM — E53.8 VITAMIN B12 DEFICIENCY (NON ANEMIC): Status: ACTIVE | Noted: 2020-12-21

## 2024-04-17 PROCEDURE — 99213 OFFICE O/P EST LOW 20 MIN: CPT | Performed by: NURSE PRACTITIONER

## 2024-04-17 NOTE — PATIENT INSTRUCTIONS
Drive Safe... Drive Alive     Sleep health profoundly affects your health, mood, and your safety. 33% of the population (one in three of us) is not getting enough sleep and many have a sleep disorder. Not getting enough sleep or having an untreated / undertreated sleep condition may make us sleepy without even knowing it. In fact, our driving could be dramatically impaired due to our sleep health. As your provider, here are some things I would like you to know about driving:     Here are some warning signs for impairment and dangerous drowsy driving:              -Having been awake more than 16 hours               -Looking tired               -Eyelid drooping              -Head nodding (it could be too late at this point)              -Driving for more than 30 minutes     Some things you could do to make the driving safer if you are experiencing some drowsiness:              -Stop driving and rest              -Call for transportation              -Make sure your sleep disorder is adequately treated     Some things that have been shown NOT to work when experiencing drowsiness while driving:              -Turning on the radio              -Opening windows              -Eating any  distracting  /  entertaining  foods (e.g., sunflower seeds, candy, or any other)              -Talking on the phone      Your decision may not only impact your life, but also the life of others. Please, remember to drive safe for yourself and all of us.   Your Body mass index is 27.15 kg/m .  Weight management is a personal decision.  If you are interested in exploring weight loss strategies, the following discussion covers the approaches that may be successful. Body mass index (BMI) is one way to tell whether you are at a healthy weight, overweight, or obese. It measures your weight in relation to your height.  A BMI of 18.5 to 24.9 is in the healthy range. A person with a BMI of 25 to 29.9 is considered overweight, and someone with a BMI  of 30 or greater is considered obese. More than two-thirds of American adults are considered overweight or obese.  Being overweight or obese increases the risk for further weight gain. Excess weight may lead to heart disease and diabetes.  Creating and following plans for healthy eating and physical activity may help you improve your health.  Weight control is part of healthy lifestyle and includes exercise, emotional health, and healthy eating habits. Careful eating habits lifelong are the mainstay of weight control. Though there are significant health benefits from weight loss, long-term weight loss with diet alone may be very difficult to achieve- studies show long-term success with dietary management in less than 10% of people. Attaining a healthy weight may be especially difficult to achieve in those with severe obesity. In some cases, medications, devices and surgical management might be considered.  What can you do?  If you are overweight or obese and are interested in methods for weight loss, you should discuss this with your provider.   Consider reducing daily calorie intake by 500 calories.   Keep a food journal.   Avoiding skipping meals, consider cutting portions instead.    Diet combined with exercise helps maintain muscle while optimizing fat loss. Strength training is particularly important for building and maintaining muscle mass. Exercise helps reduce stress, increase energy, and improves fitness. Increasing exercise without diet control, however, may not burn enough calories to loose weight.     Start walking three days a week 10-20 minutes at a time  Work towards walking thirty minutes five days a week   Eventually, increase the speed of your walking for 1-2 minutes at time    In addition, we recommend that you review healthy lifestyles and methods for weight loss available through the National Institutes of Health patient information sites:  http://win.niddk.nih.gov/publications/index.htm    And  look into health and wellness programs that may be available through your health insurance provider, employer, local community center, or [employer] club.            Your Body mass index is 27.15 kg/m .  Weight management is a personal decision.  If you are interested in exploring weight loss strategies, the following discussion covers the approaches that may be successful. Body mass index (BMI) is one way to tell whether you are at a healthy weight, overweight, or obese. It measures your weight in relation to your height.  A BMI of 18.5 to 24.9 is in the healthy range. A person with a BMI of 25 to 29.9 is considered overweight, and someone with a BMI of 30 or greater is considered obese. More than two-thirds of [place] adults are considered overweight or obese.  Being overweight or obese increases the risk for further weight gain. Excess weight may lead to heart disease and diabetes.  Creating and following plans for healthy eating and physical activity may help you improve your health.  Weight control is part of healthy lifestyle and includes exercise, emotional health, and healthy eating habits. Careful eating habits lifelong are the mainstay of weight control. Though there are significant health benefits from weight loss, long-term weight loss with diet alone may be very difficult to achieve- studies show long-term success with dietary management in less than 10% of people. Attaining a healthy weight may be especially difficult to achieve in those with severe obesity. In some cases, medications, devices and surgical management might be considered.  What can you do?  If you are overweight or obese and are interested in methods for weight loss, you should discuss this with your provider.   Consider reducing daily calorie intake by 500 calories.   Keep a food journal.   Avoiding skipping meals, consider cutting portions instead.    Diet combined with exercise helps maintain muscle while optimizing fat loss. Strength  training is particularly important for building and maintaining muscle mass. Exercise helps reduce stress, increase energy, and improves fitness. Increasing exercise without diet control, however, may not burn enough calories to loose weight.     Start walking three days a week 10-20 minutes at a time  Work towards walking thirty minutes five days a week   Eventually, increase the speed of your walking for 1-2 minutes at time    In addition, we recommend that you review healthy lifestyles and methods for weight loss available through the National Institutes of Health patient information sites:  http://win.niddk.nih.gov/publications/index.htm    And look into health and wellness programs that may be available through your health insurance provider, employer, local community center, or [employer] club.

## 2024-04-17 NOTE — PROGRESS NOTES
Obstructive Sleep Apnea - PAP Follow-Up Visit:    Chief Complaint   Patient presents with    CPAP Follow Up       Ursula Pedro comes in today for follow-up of their moderate sleep apnea, managed with CPAP.     Ursula is a delightful 64-year-old female with a past medical history in addition to her moderate sleep apnea of hypertension, prediabetes, vitamin D deficiency, polyneuropathy, adiposity, and allergic rhinitis.    Do you use a CPAP Machine at home: Yes  Overall, on a scale of 0-10 how would you rate your CPAP (0 poor, 10 great): 5  Is your mask comfortable: Yes  If not, why:    Is you mask leaking: Yes  If yes, where do you feel it: along the nose  How many night per week does the mask leak (0-7): 7  Do you notice snoring with mask on: No  Do you notice gasping arousals with mask on: No  Are you having significant oral or nasal dryness: No  Is the pressure setting comfortable: No  In not, why: only in the beginning, I feel like I'm having to gasp for air  What type of mask do you use: Nasal Mask  What is your typical bedtime: 10:30  How long does it take you to go to sleep on PAP therapy: 15-20 minutes  What time do you typically get out of bed for the day: 6:00 on work days, 7:30 off days  How many hours on average per night are you using PAP therapy: 5  How many hours are you sleeping per night: 7-8.5  Do you feel well rested in the morning: Yes        EPWORTH SLEEPINESS SCALE         4/10/2024     2:29 PM    [place] Sleepiness Scale ( NAHUM Lopes  2601-0400<br>ESS - [place]/English - Final version - 21 Nov 07 - [facility] [place].)   Sitting and reading Slight chance of dozing   Watching TV Would never doze   Sitting, inactive in a public place (e.g. a theatre or a meeting) Would never doze   As a passenger in a car for an hour without a break Would never doze   Lying down to rest in the afternoon when circumstances permit Slight chance of dozing   Sitting and talking to someone Would never doze  "  Sitting quietly after a lunch without alcohol Would never doze   In a car, while stopped for a few minutes in traffic Would never doze   [place] Score (MC) 2   [place] Score (Sleep) 2       INSOMNIA SEVERITY INDEX (ASHOK)          4/10/2024     2:24 PM   Insomnia Severity Index (ASHOK)   Difficulty falling asleep 1   Difficulty staying asleep 1   Problems waking up too early 2   How SATISFIED/DISSATISFIED are you with your CURRENT sleep pattern? 2   How NOTICEABLE to others do you think your sleep problem is in terms of impairing the quality of your life? 0   How WORRIED/DISTRESSED are you about your current sleep problem? 1   To what extent do you consider your sleep problem to INTERFERE with your daily functioning (e.g. daytime fatigue, mood, ability to function at work/daily chores, concentration, memory, mood, etc.) CURRENTLY? 0   ASHOK Total Score 7       Guidelines for Scoring/Interpretation:  Total score categories:  0-7 = No clinically significant insomnia   8-14 = Subthreshold insomnia   15-21 = Clinical insomnia (moderate severity)  22-28 = Clinical insomnia (severe)  Used via courtesy of www.SmartStudy.com.va.gov with permission from Srinivasan Daniel PhD., [facility]    [employer]   Auto-PAP 6.0 - 18.0 cmH2O 30 day usage data:    50% of days with > 4 hours of use. 1/30 days with no use.   Average use 262 minutes per day.   95%ile Leak 17.84 L/min.   CPAP 95% pressure 9.4 cm.   AHI 0.46 events per hour.     Past Sleep Studies:  Patient underwent home sleep testing on the evening of 1/25/2024 for symptoms consistent with obstructive sleep apnea.  Estimated body mass index is 26.46 kg/m  as calculated from the following:    Height as of this encounter: 1.651 m (5' 5\").    Weight as of this encounter: 72.1 kg (159 lb).  Total score - [place]: 4 (1/18/2024  7:00 PM)  Total Score: 5 (1/18/2024  7:01 PM)  AHI, combined: 23.9 events/hour  AHI, supine: 43.3 events/hour  AHI, left lateral: 1.5 events/hour  AHI, right " lateral: 20.5 events/hour  SaO2, baseline: 94%  SaO2 mitchell: 78%  Time with saturation less than or equal to 88% was 6.2 minutes.   Position: Percent of time spent: supine - 34.3%, prone - 0%, on left - 23.5%, on right - 41.8%.     Ursula and I rereviewed her initial sleep study as well as her information downloaded from her CPAP machine today.  Ursula does note that on occasion she has a nocturnal awakening after which she may find it difficult to return to sleep.  This in part, may be due to rumination of thoughts, and she looks forward to returning to her citalopram which was effective in helping her sleep as she progressed through menopause.  We discussed that should this not be an effective solution for her, she may reach out and I could prescribe some hydroxyzine for her.      Reviewed by team:  Tobacco  Allergies  Meds  Problems  Med Hx  Surg Hx  Fam Hx        Reviewed by provider:  Tobacco  Allergies  Meds  Problems  Med Hx  Surg Hx  Fam Hx           Problem List:  Patient Active Problem List    Diagnosis Date Noted    Other sleep apnea 02/21/2024     Priority: Medium    Polyneuropathy 02/15/2023     Priority: Medium    Adenomatous polyp of colon, unspecified part of colon 02/15/2023     Priority: Medium    Decreased sense of smell 10/02/2019     Priority: Medium    Family history of osteoporosis 07/25/2018     Priority: Medium    Vitamin D deficiency 07/25/2018     Priority: Medium    Adiposity      Priority: Medium     Created by Conversion        Hypercholesteremia 02/15/2017     Priority: Medium    Allergic Rhinitis      Priority: Medium     Created by Conversion  Replacement Utility updated for latest IMO load        Basal Cell Carcinoma Of The Skin      Priority: Medium     Created by Conversion  Replacement Utility updated for latest IMO load        Hypertension      Priority: Medium     Created by Conversion  [facility] Annotation: Oct 12 2012  8:49AM - Rekha Simon:    HCTZ--->palpitations  Replacement Utility updated for latest IMO load        Mammogram - Abnormal      Priority: Medium     Created by Conversion  Replacement Utility updated for latest IMO load        Prediabetes 12/18/2015     Priority: Medium    Oral Allergy Syndrome      Priority: Medium     Created by Conversion        Female Stress Incontinence      Priority: Medium     Created by Conversion              /83   Pulse 87   Wt 72.1 kg (159 lb)   SpO2 97%   BMI 27.15 kg/m      Impression/Plan:  Moderate Obstructive Sleep Apnea   Ursula admits to using her CPAP equipment on a nightly basis, and attempts to do so for the entire duration of her sleep.  She did note during this past month that she suffered from a sinus infection of which she found it difficult to use her equipment on some nights.  A comprehensive order for needed supplies and equipment was placed, for the coming year.   Recommend patient optimize her sleep schedule as well as her sleep hygiene practices to mitigate any sleep disruption.   Recommend patient employ safe driving practices such as not driving motor vehicle should she become drowsy      Ursula Pedro will follow up in about 1 year(s), sooner if needed.     20 minutes spent with patient, all of which were spent face-to-face counseling, consulting, coordinating plan of care.      VLAD Byers CNP    CC:  Jami Mcgarry

## 2024-04-17 NOTE — NURSING NOTE
"Chief Complaint   Patient presents with    CPAP Follow Up       Initial /83   Pulse 87   Wt 72.1 kg (159 lb)   SpO2 97%   BMI 27.15 kg/m   Estimated body mass index is 27.15 kg/m  as calculated from the following:    Height as of 2/21/24: 1.63 m (5' 4.17\").    Weight as of this encounter: 72.1 kg (159 lb).    Medication Reconciliation: complete    Neck circumference:  inches /  centimeters.    DME: [facility]    [facility] sent reminding patient of 1 year follow up appointment.     Janett Pritchett MA     "

## 2024-05-29 ENCOUNTER — MYC MEDICAL ADVICE (OUTPATIENT)
Dept: FAMILY MEDICINE | Facility: CLINIC | Age: 64
End: 2024-05-29
Payer: COMMERCIAL

## 2024-05-29 DIAGNOSIS — E78.00 HYPERCHOLESTEREMIA: Primary | ICD-10-CM

## 2024-06-17 ENCOUNTER — LAB (OUTPATIENT)
Dept: LAB | Facility: CLINIC | Age: 64
End: 2024-06-17
Payer: COMMERCIAL

## 2024-06-17 DIAGNOSIS — E78.00 HYPERCHOLESTEREMIA: ICD-10-CM

## 2024-06-17 LAB
ALT SERPL W P-5'-P-CCNC: 17 U/L (ref 0–50)
AST SERPL W P-5'-P-CCNC: 27 U/L (ref 0–45)
CHOLEST SERPL-MCNC: 151 MG/DL
FASTING STATUS PATIENT QL REPORTED: NORMAL
HDLC SERPL-MCNC: 54 MG/DL
LDLC SERPL CALC-MCNC: 80 MG/DL
NONHDLC SERPL-MCNC: 97 MG/DL
TRIGL SERPL-MCNC: 87 MG/DL

## 2024-06-17 PROCEDURE — 84460 ALANINE AMINO (ALT) (SGPT): CPT

## 2024-06-17 PROCEDURE — 84450 TRANSFERASE (AST) (SGOT): CPT

## 2024-06-17 PROCEDURE — 80061 LIPID PANEL: CPT

## 2024-06-17 PROCEDURE — 36415 COLL VENOUS BLD VENIPUNCTURE: CPT

## 2024-06-19 ENCOUNTER — HOSPITAL ENCOUNTER (OUTPATIENT)
Dept: MAMMOGRAPHY | Facility: CLINIC | Age: 64
Discharge: HOME | End: 2024-06-19
Attending: FAMILY MEDICINE | Admitting: FAMILY MEDICINE
Payer: COMMERCIAL

## 2024-06-19 DIAGNOSIS — Z12.31 VISIT FOR SCREENING MAMMOGRAM: ICD-10-CM

## 2024-06-19 PROCEDURE — 77063 BREAST TOMOSYNTHESIS BI: CPT

## 2024-09-04 ENCOUNTER — TRANSFERRED RECORDS (OUTPATIENT)
Dept: HEALTH INFORMATION MANAGEMENT | Facility: CLINIC | Age: 64
End: 2024-09-04
Payer: COMMERCIAL

## 2024-09-25 ENCOUNTER — OFFICE VISIT (OUTPATIENT)
Dept: FAMILY MEDICINE | Facility: CLINIC | Age: 64
End: 2024-09-25
Payer: COMMERCIAL

## 2024-09-25 VITALS
OXYGEN SATURATION: 98 % | SYSTOLIC BLOOD PRESSURE: 136 MMHG | BODY MASS INDEX: 27.72 KG/M2 | HEART RATE: 66 BPM | DIASTOLIC BLOOD PRESSURE: 81 MMHG | RESPIRATION RATE: 16 BRPM | HEIGHT: 64 IN | TEMPERATURE: 97.8 F | WEIGHT: 162.4 LBS

## 2024-09-25 DIAGNOSIS — I10 ESSENTIAL HYPERTENSION: ICD-10-CM

## 2024-09-25 DIAGNOSIS — G62.9 POLYNEUROPATHY: Primary | ICD-10-CM

## 2024-09-25 DIAGNOSIS — R73.03 PREDIABETES: ICD-10-CM

## 2024-09-25 DIAGNOSIS — E78.00 HYPERCHOLESTEREMIA: ICD-10-CM

## 2024-09-25 LAB
EST. AVERAGE GLUCOSE BLD GHB EST-MCNC: 123 MG/DL
HBA1C MFR BLD: 5.9 % (ref 0–5.6)

## 2024-09-25 PROCEDURE — 36415 COLL VENOUS BLD VENIPUNCTURE: CPT | Performed by: FAMILY MEDICINE

## 2024-09-25 PROCEDURE — 80061 LIPID PANEL: CPT | Performed by: FAMILY MEDICINE

## 2024-09-25 PROCEDURE — 83036 HEMOGLOBIN GLYCOSYLATED A1C: CPT | Performed by: FAMILY MEDICINE

## 2024-09-25 PROCEDURE — 99213 OFFICE O/P EST LOW 20 MIN: CPT | Performed by: FAMILY MEDICINE

## 2024-09-25 NOTE — PROGRESS NOTES
"  Assessment & Plan       ICD-10-CM    1. Polyneuropathy  G62.9       2. Hypercholesteremia  E78.00 Lipid panel reflex to direct LDL Fasting      3. Prediabetes  R73.03 Hemoglobin A1c      4. Hypertension  I10         Please have physical in February and come fasting if possible.    I am glad you check your blood pressure periodically at work.  If it is ever 140 or higher on top or 90 or higher on the bottom let me know and I will increase the lisinopril.    If you would like to see neurology for the neuropathy let me know.      Glad you have dermatology set for your full-body skin check.          BMI  Estimated body mass index is 27.73 kg/m  as calculated from the following:    Height as of this encounter: 1.63 m (5' 4.17\").    Weight as of this encounter: 73.7 kg (162 lb 6.4 oz).   Weight management plan: Discussed healthy diet and exercise guidelines          Sue Vergara is a 64 year old, presenting for the following health issues:  Recheck Medication (Follow up on statin and bp medication- no concerns)        9/25/2024     9:25 AM   Additional Questions   Roomed by Sarah MUNOZ CMA     History of Present Illness       Hyperlipidemia:  She presents for follow up of hyperlipidemia.   She is taking medication to lower cholesterol. She is not having myalgia or other side effects to statin medications.    Hypertension: She presents for follow up of hypertension.  She does check blood pressure  regularly outside of the clinic. Outpatient blood pressures have not been over 140/90. She does not follow a low salt diet.     She eats 4 or more servings of fruits and vegetables daily.She consumes 0 sweetened beverage(s) daily.She exercises with enough effort to increase her heart rate 30 to 60 minutes per day.  She exercises with enough effort to increase her heart rate 4 days per week.   She is taking medications regularly.                 Hyperlipidemia:  Started Crestor 10 about Feb or Mar 2024.  No side " "effects.    Hypertension:  No chest pain shortness of breath or edema.  She wouks in health care. At [facility] 1teen to 120 / 70.     Polyneuropathy:  Saw spine clinic.  Had EMG and coming from calves and down.  Told possibly genetic.  Both parents have neuroapathy.  Dad had diabetes.  She takes high dose B12 and might be a little less symptoms.  No pain, has numbness in feet on big toe and ball of feet.         Objective    /81 (BP Location: Left arm, Patient Position: Sitting, Cuff Size: Adult Regular)   Pulse 66   Temp 97.8  F (36.6  C) (Oral)   Resp 16   Ht 1.63 m (5' 4.17\")   Wt 73.7 kg (162 lb 6.4 oz)   SpO2 98%   BMI 27.73 kg/m    Body mass index is 27.73 kg/m .  Physical Exam   GENERAL: alert and no distress              Signed Electronically by: Jami Mcgarry MD    "

## 2024-09-25 NOTE — PATIENT INSTRUCTIONS
Please have physical in February and come fasting if possible.    I am glad you check your blood pressure periodically at work.  If it is ever 140 or higher on top or 90 or higher on the bottom let me know and I will increase the lisinopril.    If you would like to see neurology for the neuropathy let me know.      Glad you have dermatology set for your full-body skin check.

## 2024-09-26 LAB
CHOLEST SERPL-MCNC: 160 MG/DL
FASTING STATUS PATIENT QL REPORTED: YES
HDLC SERPL-MCNC: 54 MG/DL
LDLC SERPL CALC-MCNC: 90 MG/DL
NONHDLC SERPL-MCNC: 106 MG/DL
TRIGL SERPL-MCNC: 82 MG/DL

## 2025-02-12 ENCOUNTER — TRANSFERRED RECORDS (OUTPATIENT)
Dept: HEALTH INFORMATION MANAGEMENT | Facility: CLINIC | Age: 65
End: 2025-02-12
Payer: COMMERCIAL

## 2025-02-21 SDOH — HEALTH STABILITY: PHYSICAL HEALTH: ON AVERAGE, HOW MANY MINUTES DO YOU ENGAGE IN EXERCISE AT THIS LEVEL?: 50 MIN

## 2025-02-21 SDOH — HEALTH STABILITY: PHYSICAL HEALTH: ON AVERAGE, HOW MANY DAYS PER WEEK DO YOU ENGAGE IN MODERATE TO STRENUOUS EXERCISE (LIKE A BRISK WALK)?: 3 DAYS

## 2025-02-21 ASSESSMENT — SOCIAL DETERMINANTS OF HEALTH (SDOH): HOW OFTEN DO YOU GET TOGETHER WITH FRIENDS OR RELATIVES?: ONCE A WEEK

## 2025-02-26 ENCOUNTER — OFFICE VISIT (OUTPATIENT)
Dept: FAMILY MEDICINE | Facility: CLINIC | Age: 65
End: 2025-02-26
Payer: COMMERCIAL

## 2025-02-26 VITALS
SYSTOLIC BLOOD PRESSURE: 118 MMHG | TEMPERATURE: 98 F | HEIGHT: 64 IN | RESPIRATION RATE: 16 BRPM | BODY MASS INDEX: 27.49 KG/M2 | HEART RATE: 85 BPM | DIASTOLIC BLOOD PRESSURE: 78 MMHG | OXYGEN SATURATION: 95 % | WEIGHT: 161 LBS

## 2025-02-26 DIAGNOSIS — L30.9 ECZEMA, UNSPECIFIED TYPE: Primary | ICD-10-CM

## 2025-02-26 DIAGNOSIS — Z12.31 VISIT FOR SCREENING MAMMOGRAM: ICD-10-CM

## 2025-02-26 DIAGNOSIS — I10 ESSENTIAL HYPERTENSION: ICD-10-CM

## 2025-02-26 DIAGNOSIS — Z01.84 IMMUNITY STATUS TESTING: ICD-10-CM

## 2025-02-26 DIAGNOSIS — C44.91 BASAL CELL CARCINOMA (BCC), UNSPECIFIED SITE: ICD-10-CM

## 2025-02-26 DIAGNOSIS — R73.03 PREDIABETES: ICD-10-CM

## 2025-02-26 DIAGNOSIS — E53.8 VITAMIN B12 DEFICIENCY (NON ANEMIC): ICD-10-CM

## 2025-02-26 DIAGNOSIS — Z71.84 TRAVEL ADVICE ENCOUNTER: ICD-10-CM

## 2025-02-26 DIAGNOSIS — Z00.00 ENCOUNTER FOR PREVENTATIVE ADULT HEALTH CARE EXAMINATION: ICD-10-CM

## 2025-02-26 DIAGNOSIS — R45.86 MOOD CHANGE: ICD-10-CM

## 2025-02-26 DIAGNOSIS — E78.00 HYPERCHOLESTEREMIA: ICD-10-CM

## 2025-02-26 DIAGNOSIS — E55.9 VITAMIN D DEFICIENCY: ICD-10-CM

## 2025-02-26 LAB
ERYTHROCYTE [DISTWIDTH] IN BLOOD BY AUTOMATED COUNT: 11.5 % (ref 10–15)
EST. AVERAGE GLUCOSE BLD GHB EST-MCNC: 111 MG/DL
HBA1C MFR BLD: 5.5 % (ref 0–5.6)
HCT VFR BLD AUTO: 45.6 % (ref 35–47)
HGB BLD-MCNC: 15 G/DL (ref 11.7–15.7)
MCH RBC QN AUTO: 28.8 PG (ref 26.5–33)
MCHC RBC AUTO-ENTMCNC: 32.9 G/DL (ref 31.5–36.5)
MCV RBC AUTO: 88 FL (ref 78–100)
PLATELET # BLD AUTO: 311 10E3/UL (ref 150–450)
RBC # BLD AUTO: 5.21 10E6/UL (ref 3.8–5.2)
WBC # BLD AUTO: 5.4 10E3/UL (ref 4–11)

## 2025-02-26 PROCEDURE — 85027 COMPLETE CBC AUTOMATED: CPT | Performed by: FAMILY MEDICINE

## 2025-02-26 PROCEDURE — 83036 HEMOGLOBIN GLYCOSYLATED A1C: CPT | Performed by: FAMILY MEDICINE

## 2025-02-26 PROCEDURE — 36415 COLL VENOUS BLD VENIPUNCTURE: CPT | Performed by: FAMILY MEDICINE

## 2025-02-26 RX ORDER — CITALOPRAM HYDROBROMIDE 20 MG/1
20 TABLET ORAL DAILY
Qty: 90 TABLET | Refills: 3 | Status: SHIPPED | OUTPATIENT
Start: 2025-02-26

## 2025-02-26 RX ORDER — LISINOPRIL 5 MG/1
TABLET ORAL
Qty: 90 TABLET | Refills: 3 | Status: SHIPPED | OUTPATIENT
Start: 2025-02-26

## 2025-02-26 RX ORDER — TRIAMCINOLONE ACETONIDE 1 MG/G
CREAM TOPICAL 2 TIMES DAILY
Qty: 60 G | Refills: 5 | Status: SHIPPED | OUTPATIENT
Start: 2025-02-26

## 2025-02-26 RX ORDER — ROSUVASTATIN CALCIUM 10 MG/1
10 TABLET, COATED ORAL DAILY
Qty: 90 TABLET | Refills: 3 | Status: SHIPPED | OUTPATIENT
Start: 2025-02-26

## 2025-02-26 RX ORDER — CIPROFLOXACIN 500 MG/1
500 TABLET, FILM COATED ORAL 2 TIMES DAILY
Qty: 20 TABLET | Refills: 0 | Status: SHIPPED | OUTPATIENT
Start: 2025-02-26 | End: 2025-03-08

## 2025-02-26 RX ORDER — AZITHROMYCIN 250 MG/1
TABLET, FILM COATED ORAL
Qty: 6 TABLET | Refills: 0 | Status: SHIPPED | OUTPATIENT
Start: 2025-02-26 | End: 2025-03-03

## 2025-02-26 ASSESSMENT — ANXIETY QUESTIONNAIRES
7. FEELING AFRAID AS IF SOMETHING AWFUL MIGHT HAPPEN: NOT AT ALL
5. BEING SO RESTLESS THAT IT IS HARD TO SIT STILL: NOT AT ALL
3. WORRYING TOO MUCH ABOUT DIFFERENT THINGS: NOT AT ALL
GAD7 TOTAL SCORE: 1
7. FEELING AFRAID AS IF SOMETHING AWFUL MIGHT HAPPEN: NOT AT ALL
GAD7 TOTAL SCORE: 1
6. BECOMING EASILY ANNOYED OR IRRITABLE: NOT AT ALL
8. IF YOU CHECKED OFF ANY PROBLEMS, HOW DIFFICULT HAVE THESE MADE IT FOR YOU TO DO YOUR WORK, TAKE CARE OF THINGS AT HOME, OR GET ALONG WITH OTHER PEOPLE?: NOT DIFFICULT AT ALL
GAD7 TOTAL SCORE: 1
1. FEELING NERVOUS, ANXIOUS, OR ON EDGE: SEVERAL DAYS
4. TROUBLE RELAXING: NOT AT ALL
2. NOT BEING ABLE TO STOP OR CONTROL WORRYING: NOT AT ALL
IF YOU CHECKED OFF ANY PROBLEMS ON THIS QUESTIONNAIRE, HOW DIFFICULT HAVE THESE PROBLEMS MADE IT FOR YOU TO DO YOUR WORK, TAKE CARE OF THINGS AT HOME, OR GET ALONG WITH OTHER PEOPLE: NOT DIFFICULT AT ALL

## 2025-02-26 ASSESSMENT — PATIENT HEALTH QUESTIONNAIRE - PHQ9
10. IF YOU CHECKED OFF ANY PROBLEMS, HOW DIFFICULT HAVE THESE PROBLEMS MADE IT FOR YOU TO DO YOUR WORK, TAKE CARE OF THINGS AT HOME, OR GET ALONG WITH OTHER PEOPLE: NOT DIFFICULT AT ALL
SUM OF ALL RESPONSES TO PHQ QUESTIONS 1-9: 0
SUM OF ALL RESPONSES TO PHQ QUESTIONS 1-9: 0

## 2025-02-26 NOTE — PATIENT INSTRUCTIONS
COVID shot at your pharmacy if you wish.  Good idea to get in before you travel.    We will do a travel consult today for [facility].    Will give a prescription for a Z-Lev to give if you have like a sinus infection or respiratory infection but only use if you need it.    Will give a prescription for Cipro 500 mg twice a day for 10 days for severe traveler's diarrhea.  If you have a UTI you could probably get by with 5 days of medication.    Oral typhoid vaccine is recommended for [facility].  Prescribing a pill to take every other day for 4 doses.  Take it as soon as you get it.    For your area of travel no malaria prophylaxis is recommended.    Also checking immunity for hepatitis A, hepatitis B, measles, mumps and rubella.      Health Maintenance   Topic Date Due    Pneumococcal Vaccine: 50+ Years (1 of 1 - PCV) Today    COVID-19 Vaccine (6 - 2024-25 season) Will get before trip    ANNUAL REVIEW OF HM ORDERS  Today    YEARLY PREVENTIVE VISIT  Today    BMP  Today    LIPID  Today    HPV TEST  02/15/2026    PAP  02/15/2026    MAMMO SCREENING  06/19/2025, ordered    GLUCOSE  Today    COLORECTAL CANCER SCREENING  03/29/2028    DTAP/TDAP/TD IMMUNIZATION (3 - Td or Tdap) 07/25/2028    ADVANCE CARE PLANNING  To bring in    RSV VACCINE (1 - 1-dose 75+ series) Completed    HEPATITIS C SCREENING  Completed    HIV SCREENING  Completed    PHQ-2 (once per calendar year)  Completed    INFLUENZA VACCINE  Completed    ZOSTER IMMUNIZATION  Completed    HPV IMMUNIZATION  Aged Out    MENINGITIS IMMUNIZATION  Aged Out    LUNG CANCER SCREENING  Discontinued, non smoker     Dexa scan:  Due Jan 2026 or 2027

## 2025-02-26 NOTE — PROGRESS NOTES
Preventive Care Visit  [facility]  Jami Mcgarry MD, Family Medicine  Feb 26, 2025      Assessment & Plan       ICD-10-CM    1. Eczema, unspecified type  L30.9 triamcinolone (KENALOG) 0.1 % external cream      2. Hypercholesteremia  E78.00 rosuvastatin (CRESTOR) 10 MG tablet     Lipid panel reflex to direct LDL Fasting     Lipid panel reflex to direct LDL Fasting      3. Essential hypertension  I10 lisinopril (ZESTRIL) 5 MG tablet     Comprehensive metabolic panel (BMP + Alb, Alk Phos, ALT, AST, Total. Bili, TP)     CBC with platelets     TSH with free T4 reflex     Comprehensive metabolic panel (BMP + Alb, Alk Phos, ALT, AST, Total. Bili, TP)     CBC with platelets     TSH with free T4 reflex      4. Mood change  R45.86 citalopram (CELEXA) 20 MG tablet      5. Travel advice encounter  Z71.84 typhoid (VIVOTIF) CR capsule     ciprofloxacin (CIPRO) 500 MG tablet     azithromycin (ZITHROMAX) 250 MG tablet      6. Vitamin B12 deficiency (non anemic)  E53.8 Vitamin B12     Vitamin B12      7. Vitamin D deficiency  E55.9 Vitamin D Deficiency     Vitamin D Deficiency      8. Prediabetes  R73.03 Hemoglobin A1c     Hemoglobin A1c      9. Basal cell carcinoma (BCC), unspecified site  C44.91       10. Visit for screening mammogram  Z12.31 MA Screen Bilateral w/Fredrick      11. Encounter for preventative adult health care examination  Z00.00       12. Immunity status testing  Z01.84 Hepatitis A Antibody Total     Hepatitis B Surface Antibody     Mumps Immune Status, IgG     Rubella Antibody IgG Quantitative     Rubeola Antibody IgG     Hepatitis A Antibody Total     Hepatitis B Surface Antibody     Mumps Immune Status, IgG     Rubella Antibody IgG Quantitative     Rubeola Antibody IgG        COVID shot at your pharmacy if you wish.  Good idea to get in before you travel.    We will do a travel consult today for [place].    Will give a prescription for a Z-Lev to give if you have like a sinus  infection or respiratory infection but only use if you need it.    Will give a prescription for Cipro 500 mg twice a day for 10 days for severe traveler's diarrhea.  If you have a UTI you could probably get by with 5 days of medication.    Oral typhoid vaccine is recommended for [facility].  Prescribing a pill to take every other day for 4 doses.  Take it as soon as you get it.    For your area of travel no malaria prophylaxis is recommended.    Also checking immunity for hepatitis A, hepatitis B, measles, mumps and rubella.    The longitudinal plan of care for the diagnosis(es)/condition(s) as documented were addressed during this visit. Due to the added complexity in care, I will continue to support Ursula in the subsequent management and with ongoing continuity of care.  Helping to manage her hypertension.    Patient has been advised of split billing requirements and indicates understanding: Yes        Counseling  Appropriate preventive services were addressed with this patient via screening, questionnaire, or discussion as appropriate for fall prevention, nutrition, physical activity, Tobacco-use cessation, social engagement, weight loss and cognition.  Checklist reviewing preventive services available has been given to the patient.  Reviewed patient's diet, addressing concerns and/or questions.   She is at risk for lack of exercise and has been provided with information to increase physical activity for the benefit of her well-being.           Subjective   Ursula is a 64 year old, presenting for the following:  Physical (Fasting for blood work today. ) and Travel Clinic (Patient states that she will be traveling to [facility] and would like to get an antibiotic for the trip )        2/26/2025     9:10 AM   Additional Questions   Roomed by Grace SUNG CMA          HPI            Travel consult:  Going to [facility] April 19: Would like to do a travel consult today and have prescription for antibiotics as  needed.    Eczema:  Terrible bout from Nov. until a couple of weeks ago.  Had Triamcinalone 0.1% and helped and would like a refill.    Hypertension: Controlled on medication.    Health Maintenance   Topic Date Due    Pneumococcal Vaccine: 50+ Years (1 of 1 - PCV) Today    COVID-19 Vaccine (6 - 2024-25 season) Will get before trip    ANNUAL REVIEW OF HM ORDERS  Today    YEARLY PREVENTIVE VISIT  Today    BMP  Today    LIPID  Today    HPV TEST  02/15/2026    PAP  02/15/2026    MAMMO SCREENING  06/19/2025, ordered    GLUCOSE  Today    COLORECTAL CANCER SCREENING  03/29/2028    DTAP/TDAP/TD IMMUNIZATION (3 - Td or Tdap) 07/25/2028    ADVANCE CARE PLANNING  To bring in    RSV VACCINE (1 - 1-dose 75+ series) Completed    HEPATITIS C SCREENING  Completed    HIV SCREENING  Completed    PHQ-2 (once per calendar year)  Completed    INFLUENZA VACCINE  Completed    ZOSTER IMMUNIZATION  Completed    HPV IMMUNIZATION  Aged Out    MENINGITIS IMMUNIZATION  Aged Out    LUNG CANCER SCREENING  Discontinued, non smoker     Dexa scan:  Due Jan 2026 or 2027    Health Care Directive  Patient does not have a Health Care Directive: Patient states has Advance Directive and will bring in a copy to clinic.      2/21/2025   General Health   How would you rate your overall physical health? Good   Feel stress (tense, anxious, or unable to sleep) Only a little   (!) STRESS CONCERN      2/21/2025   Nutrition   Three or more servings of calcium each day? Yes   Diet: Regular (no restrictions)   How many servings of fruit and vegetables per day? 4 or more   How many sweetened beverages each day? 0-1         2/21/2025   Exercise   Days per week of moderate/strenous exercise 3 days   Average minutes spent exercising at this level 50 min         2/21/2025   Social Factors   Frequency of gathering with friends or relatives Once a week   Worry food won't last until get money to buy more No   Food not last or not have enough money for food? No   Do you have  housing? (Housing is defined as stable permanent housing and does not include staying ouside in a car, in a tent, in an abandoned building, in an overnight shelter, or couch-surfing.) Yes   Are you worried about losing your housing? No   Lack of transportation? No   Unable to get utilities (heat,electricity)? No         2025   Fall Risk   Fallen 2 or more times in the past year? No   Trouble with walking or balance? No          2025   Dental   Dentist two times every year? Yes         2024   TB Screening   Were you born outside of the [place]? No       Today's PHQ-9 Score:       2025     9:03 AM   PHQ-9 SCORE   PHQ-9 Total Score MyChart 0   PHQ-9 Total Score 0        Patient-reported         2025   Substance Use   Alcohol more than 3/day or more than 7/wk No   Do you use any other substances recreationally? No     Social History     Tobacco Use    Smoking status: Former     Current packs/day: 0.00     Types: Cigarettes     Quit date: 1982     Years since quittin.1    Smokeless tobacco: Never    Tobacco comments:     smoked casually in high school and college, about a pack a week   Vaping Use    Vaping status: Never Used   Substance Use Topics    Alcohol use: Yes     Comment: social drinking    Drug use: Not Currently     Comment: high school college, VERY casual           2023   LAST FHS-7 RESULTS   1st degree relative breast or ovarian cancer No   Any relative bilateral breast cancer No   Any male have breast cancer No   Any ONE woman have BOTH breast AND ovarian cancer No   Any woman with breast cancer before 50yrs No   2 or more relatives with breast AND/OR ovarian cancer No   2 or more relatives with breast AND/OR bowel cancer Yes        Mammogram Screening - Mammogram every 1-2 years updated in Health Maintenance based on mutual decision making        2025   STI Screening   New sexual partner(s) since last STI/HIV test? No     History of abnormal Pap smear: see below         "Latest Ref Rng & Units 2/15/2023     2:05 PM 11/11/2020    11:48 AM 7/25/2018    12:57 PM   PAP / HPV   PAP  Negative for Intraepithelial Lesion or Malignancy (NILM)  Negative for squamous intraepithelial lesion or malignancy  Electronically signed by Shala Taveras CT (ASCP) on 11/19/2020 at  9:44 AM    Negative for squamous intraepithelial lesion or malignancy  Electronically signed by Shala Taveras CT (ASCP) on 8/1/2018 at  2:53 PM      HPV 16 DNA Negative Negative  Negative  Negative    HPV 18 DNA Negative Negative  Negative  Negative    Other HR HPV Negative Negative  Negative  Negative      ASCVD Risk   The 10-year ASCVD risk score (Daniela TREVIÑO, et al., 2019) is: 4%    Values used to calculate the score:      Age: 64 years      Sex: Female      Is Non- : No      Diabetic: No      Tobacco smoker: No      Systolic Blood Pressure: 118 mmHg      Is BP treated: Yes      HDL Cholesterol: 54 mg/dL      Total Cholesterol: 160 mg/dL           Reviewed and updated as needed this visit by Provider                             Objective    Exam  /78 (BP Location: Left arm, Patient Position: Sitting, Cuff Size: Adult Large)   Pulse 85   Temp 98  F (36.7  C) (Oral)   Resp 16   Ht 1.63 m (5' 4.17\")   Wt 73 kg (161 lb)   SpO2 95%   BMI 27.49 kg/m     Estimated body mass index is 27.49 kg/m  as calculated from the following:    Height as of this encounter: 1.63 m (5' 4.17\").    Weight as of this encounter: 73 kg (161 lb).    Physical Exam  GENERAL: alert and no distress  EYES: Eyes grossly normal to inspection, PERRL and conjunctivae and sclerae normal  HENT: ear canals and TM's normal, nose and mouth without ulcers or lesions  NECK: no adenopathy, no asymmetry, masses, or scars  RESP: lungs clear to auscultation - no rales, rhonchi or wheezes  BREAST: normal without masses, tenderness or nipple discharge and no palpable axillary masses or adenopathy  CV: regular rate and " rhythm, normal S1 S2, no S3 or S4, no murmur, click or rub, no peripheral edema  ABDOMEN: soft, nontender, no hepatosplenomegaly, no masses and bowel sounds normal  MS: no gross musculoskeletal defects noted, no edema  SKIN: no suspicious lesions or rashes, but full-body skin exam not performed  NEURO: Normal strength and tone, mentation intact and speech normal  PSYCH: mentation appears normal, affect normal/bright    Prior to immunization administration, verified patients identity using patient s name and date of birth. Please see Immunization Activity for additional information.     Screening Questionnaire for Adult Immunization    Are you sick today?   No   Do you have allergies to medications, food, a vaccine component or latex?   No   Have you ever had a serious reaction after receiving a vaccination?   No   Do you have a long-term health problem with heart, lung, kidney, or metabolic disease (e.g., diabetes), asthma, a blood disorder, no spleen, complement component deficiency, a cochlear implant, or a spinal fluid leak?  Are you on long-term aspirin therapy?   No   Do you have cancer, leukemia, HIV/AIDS, or any other immune system problem?   No   Do you have a parent, brother, or sister with an immune system problem?   No   In the past 3 months, have you taken medications that affect  your immune system, such as prednisone, other steroids, or anticancer drugs; drugs for the treatment of rheumatoid arthritis, Crohn s disease, or psoriasis; or have you had radiation treatments?   No   Have you had a seizure, or a brain or other nervous system problem?   No   During the past year, have you received a transfusion of blood or blood    products, or been given immune (gamma) globulin or antiviral drug?   No   For women: Are you pregnant or is there a chance you could become       pregnant during the next month?   No   Have you received any vaccinations in the past 4 weeks?   No     Immunization questionnaire  answers were all negative.      Patient instructed to remain in clinic for 15 minutes afterwards, and to report any adverse reactions.     Screening performed by Grace Koo MA on 2/26/2025 at 10:11 AM.         Signed Electronically by: Jami Mcgarry MD    Answers submitted by the patient for this visit:  Patient Health Questionnaire (Submitted on 2/26/2025)  If you checked off any problems, how difficult have these problems made it for you to do your work, take care of things at home, or get along with other people?: Not difficult at all  PHQ9 TOTAL SCORE: 0  Patient Health Questionnaire (G7) (Submitted on 2/26/2025)  JESUS 7 TOTAL SCORE: 1

## 2025-02-27 LAB
ALBUMIN SERPL BCG-MCNC: 4.4 G/DL (ref 3.5–5.2)
ALP SERPL-CCNC: 84 U/L (ref 40–150)
ALT SERPL W P-5'-P-CCNC: 20 U/L (ref 0–50)
ANION GAP SERPL CALCULATED.3IONS-SCNC: 9 MMOL/L (ref 7–15)
AST SERPL W P-5'-P-CCNC: 29 U/L (ref 0–45)
BILIRUB SERPL-MCNC: 0.4 MG/DL
BUN SERPL-MCNC: 11.3 MG/DL (ref 8–23)
CALCIUM SERPL-MCNC: 9.4 MG/DL (ref 8.8–10.4)
CHLORIDE SERPL-SCNC: 103 MMOL/L (ref 98–107)
CHOLEST SERPL-MCNC: 147 MG/DL
CREAT SERPL-MCNC: 0.8 MG/DL (ref 0.51–0.95)
EGFRCR SERPLBLD CKD-EPI 2021: 83 ML/MIN/1.73M2
FASTING STATUS PATIENT QL REPORTED: YES
FASTING STATUS PATIENT QL REPORTED: YES
GLUCOSE SERPL-MCNC: 96 MG/DL (ref 70–99)
HAV AB SER QL IA: REACTIVE
HBV SURFACE AB SERPL IA-ACNC: 208 M[IU]/ML
HBV SURFACE AB SERPL IA-ACNC: REACTIVE M[IU]/ML
HCO3 SERPL-SCNC: 27 MMOL/L (ref 22–29)
HDLC SERPL-MCNC: 51 MG/DL
LDLC SERPL CALC-MCNC: 76 MG/DL
MEV IGG SER IA-ACNC: >300 AU/ML
MEV IGG SER IA-ACNC: POSITIVE
MUMPS ANTIBODY IGG INSTRUMENT VALUE: >300 AU/ML
MUV IGG SER QL IA: POSITIVE
NONHDLC SERPL-MCNC: 96 MG/DL
POTASSIUM SERPL-SCNC: 4.3 MMOL/L (ref 3.4–5.3)
PROT SERPL-MCNC: 7.5 G/DL (ref 6.4–8.3)
RUBV IGG SERPL QL IA: 7.55 INDEX
RUBV IGG SERPL QL IA: POSITIVE
SODIUM SERPL-SCNC: 139 MMOL/L (ref 135–145)
TRIGL SERPL-MCNC: 99 MG/DL
TSH SERPL DL<=0.005 MIU/L-ACNC: 1.01 UIU/ML (ref 0.3–4.2)
VIT B12 SERPL-MCNC: 1573 PG/ML (ref 232–1245)
VIT D+METAB SERPL-MCNC: 33 NG/ML (ref 20–50)

## 2025-03-18 ENCOUNTER — MYC MEDICAL ADVICE (OUTPATIENT)
Dept: FAMILY MEDICINE | Facility: CLINIC | Age: 65
End: 2025-03-18
Payer: COMMERCIAL

## 2025-03-18 DIAGNOSIS — Z71.84 TRAVEL ADVICE ENCOUNTER: Primary | ICD-10-CM

## 2025-03-19 NOTE — TELEPHONE ENCOUNTER
Would you be able to check with the pharmacist the safety of repeating her oral typhoid vaccine since she did not refrigerate the first 1, thank you.  Jami Mcgarry MD

## 2025-04-04 ENCOUNTER — MYC MEDICAL ADVICE (OUTPATIENT)
Dept: FAMILY MEDICINE | Facility: CLINIC | Age: 65
End: 2025-04-04
Payer: COMMERCIAL

## 2025-04-10 NOTE — TELEPHONE ENCOUNTER
If she would qualify for the 24-25 COVID-vaccine.  There should not be a problem giving it with the oral typhoid, thanks.  Jami Mcgarry MD

## 2025-06-23 ENCOUNTER — HOSPITAL ENCOUNTER (OUTPATIENT)
Dept: MAMMOGRAPHY | Facility: CLINIC | Age: 65
Discharge: HOME | End: 2025-06-23
Attending: FAMILY MEDICINE | Admitting: FAMILY MEDICINE
Payer: COMMERCIAL

## 2025-06-23 DIAGNOSIS — Z12.31 VISIT FOR SCREENING MAMMOGRAM: ICD-10-CM

## 2025-06-23 PROCEDURE — 77063 BREAST TOMOSYNTHESIS BI: CPT
